# Patient Record
Sex: FEMALE | Race: WHITE | Employment: FULL TIME | URBAN - METROPOLITAN AREA
[De-identification: names, ages, dates, MRNs, and addresses within clinical notes are randomized per-mention and may not be internally consistent; named-entity substitution may affect disease eponyms.]

---

## 2022-08-18 ENCOUNTER — HOSPITAL ENCOUNTER (OUTPATIENT)
Dept: PHYSICAL THERAPY | Age: 57
Setting detail: THERAPIES SERIES
Discharge: HOME | End: 2022-08-18
Payer: COMMERCIAL

## 2022-08-18 PROCEDURE — 97162 PT EVAL MOD COMPLEX 30 MIN: CPT

## 2022-08-18 PROCEDURE — 97110 THERAPEUTIC EXERCISES: CPT
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
## 2022-08-19 ENCOUNTER — HOSPITAL ENCOUNTER (OUTPATIENT)
Dept: PHYSICAL THERAPY | Age: 57
Setting detail: THERAPIES SERIES
Discharge: HOME | End: 2022-08-19
Payer: COMMERCIAL

## 2022-08-19 PROCEDURE — 97110 THERAPEUTIC EXERCISES: CPT

## 2022-08-19 NOTE — FLOWSHEET NOTE
[facility] Outpatient Physical Therapy   [address]   Phone: [phone]   Fax: [phone]    Physical Therapy Daily Treatment Note      Date:  2022  Patient Name:  Anthony Bronson    :  1965  MRN: [id number]    Referring Provider:   Elisha Burroughs MD     Insurance: 3-Hab [facility] C9 presumptive Iris Likens - 3x week for 3 weeks (medical necessity)  Medical Diagnosis:  S06.0X1D (ICD-10-CM) - Concussion with loss of consciousness of 30 minutes or less, subsequent encounter   S06.5X0D (ICD-10-CM) - Traumatic subdural hemorrhage without loss of consciousness, subsequent encounter   S06. 6X9D (ICD-10-CM) - Traumatic subarachnoid hemorrhage with loss of consciousness of unspecified duration, subsequent encounter   S82.101D (ICD-10-CM) - Unspecified fracture of upper end of right tibia, subsequent encounter for closed fracture with routine healing   S00.83XD (ICD-10-CM) - Contusion of other part of head, subsequent encounter      Rehab Codes: R26.2, R 53.1  Onset date: 22                 Next 's appt.: Dr Harsh Chang 22, Ortho 22  Visit count  (currently approved for 9 visits total)                             Cancel/No Shows: 0/0      Subjective:  Patient reporting to therapy stating she was sore after her HEP yesterday.   Pain:  [x] Yes  [] No Location: RLE  Pain Rating: (0-10 scale) 2/10  Pain altered Tx:  [] No  [] Yes  Action:  Comments:    Objective:  Modalities:   Precautions: 50% WB RLE   Exercises:  Exercise Reps/ Time Weight/ Level Comments Completed   Gastroc Stretch 3x 30\"  RLE Only X   Hamstring Stretch 3x 30\"  RLE Only X   SLR 10x2   HEP  reviewed X   SL hip abd 10x2   HEP  reviewed X   SLclamshells 10x2   X   Prone hip extension 10x2   HEP  reviewed X   Prone HSC 10x2   X   SAQ 10x2 3\"   HEP  reviewed X   LAQ     HEP               Weight shifting onto RLE 15x WB ~75# Utilize scale to ensure proper WB status X Other:    Specific Instructions for next treatment: Specific Instructions for next treatment: focus on knee and hip strengthening. Progress standing  R LE and ambulation once weightbearing restriction is lifted. Assessment: [] Progressing toward goals. [] No change. [x] Other: 8/19 Began session with education on 50% WB status as patient voiced that she didn't fully understand her WB status. Reviewed pt's HEP to ensure proper understanding of exercises with patient demonstrating good understanding and carry over of instruction. Added stretches to decrease tightness in gastroc and hamstring. Patient declined CP/Vaso at the end of session. Educated on elevating her RLE throughout the day with added ankle pumps to decrease swelling and promote circulation in RLE. [x] Patient would continue to benefit from skilled physical therapy services in order to: increased RLE strength to improve stability, ambulate properly and return to PLOF. STG: (to be met in 9 treatments)  ? Pain: to 2/10 at most while walking for patient to return to work and make it through entire shift with minimal pain. ? ROM: of Right knee extension to 0 degrees to normalize stance phase in gait. ? Strength: of R LE to 4+/5 to improve functional strength to prepare for gait without AD. ? Function: on LEFI to <25% impairment. Independent with Home Exercise Programs  Demonstrate Knowledge of fall prevention  LTG: (to be met in 18 treatments)  Patient to ambulate without AD with normal gait pattern (once 100% WB R LE) without pain or imbalance for her to return to work without restrictions. Patient to ascend/descend flight of stairs (once 100% WB R LE) with reciprocal pattern and no pain for her to resume work duties with ease. Patient goals: to get full use of R leg and get back to work.      Pt. Education:  [x] Yes  [] No  [] Reviewed Prior HEP/Ed  Method of Education: [] Verbal  [] Demo  [] Written  Comprehension of Education:  [] Verbalizes understanding. [] Demonstrates understanding. [] Needs review. [] Demonstrates/verbalizes HEP/Ed previously given. Exercises  Supine Knee Extension Strengthening - 2 x daily - 7 x weekly - 2 sets - 10 reps - 5\" hold  Active Straight Leg Raise with Quad Set - 2 x daily - 7 x weekly - 2 sets - 10 reps - 5\" hold  Sidelying Hip Abduction - 2 x daily - 7 x weekly - 2 sets - 10 reps  Prone Hip Extension - 2 x daily - 7 x weekly - 2 sets - 10 reps  Seated Long Arc Quad - 2 x daily - 7 x weekly - 2 sets - 10 reps - 5\" hold    Plan: [x] Continue per plan of care.    [] Other:      Treatment Charges: Mins Units TIME   []  Modalities      [x]  Ther Exercise 45 3 5909-0000   []  Manual Therapy      []  Ther Activities      []  Aquatics      []  Neuromuscular      [] Vasocompression      [] Gait Training      [] Dry needling        [] 1 or 2 muscles        [] 3 or more muscles      []  Other      Total Treatment time 45 3      Time In: 1284            Time Out: 0900    Electronically signed by:  Ronny Hough PTA

## 2022-08-23 ENCOUNTER — HOSPITAL ENCOUNTER (OUTPATIENT)
Dept: PHYSICAL THERAPY | Age: 57
Setting detail: THERAPIES SERIES
Discharge: HOME | End: 2022-08-23
Payer: COMMERCIAL

## 2022-08-23 PROCEDURE — 97110 THERAPEUTIC EXERCISES: CPT

## 2022-08-23 NOTE — FLOWSHEET NOTE
[address] [facility] Outpatient Physical Therapy   [address]   Phone: [phone]   Fax: [phone]    Physical Therapy Daily Treatment Note      Date:  2022  Patient Name:  Juve Landeros    :  1965  MRN: [id number]    Referring Provider:   Yael Marina MD     Insurance: 3-Hab [facility] - 3x week for 3 weeks (medical necessity)  Medical Diagnosis:  S06.0X1D (ICD-10-CM) - Concussion with loss of consciousness of 30 minutes or less, subsequent encounter   S06.5X0D (ICD-10-CM) - Traumatic subdural hemorrhage without loss of consciousness, subsequent encounter   S06. 6X9D (ICD-10-CM) - Traumatic subarachnoid hemorrhage with loss of consciousness of unspecified duration, subsequent encounter   S82.101D (ICD-10-CM) - Unspecified fracture of upper end of right tibia, subsequent encounter for closed fracture with routine healing   S00.83XD (ICD-10-CM) - Contusion of other part of head, subsequent encounter      Rehab Codes: R26.2, R 53.1  Onset date: 22                 Next 's appt.: Dr Degroot Fails 22, Ortho 22  Visit count 3/18 (currently approved for 9 visits total)                             Cancel/No Shows: 0/0      Subjective:  Patient arriving to therapy with FWW. Patient reporting to therapy without any new concerns.   Pain:  [x] Yes  [] No Location: RLE  Pain Rating: (0-10 scale) 1/10- sore  Pain altered Tx:  [] No  [] Yes  Action:  Comments:    Objective:  Modalities:   Precautions: 50% WB RLE   Exercises:  Exercise Reps/ Time Weight/ Level Comments Completed   Gastroc Stretch 3x 30\"  RLE Only X   Hamstring Stretch 3x 30\"  RLE Only X   SLR 10x2   HEP  reviewed X   SL hip abd 10x2   HEP  reviewed X   SLclamshells 10x2   X   Prone hip extension 10x2   HEP  caused inc in pain  X   Prone HSC 10x2   X   SAQ 10x2 3\"   HEP  reviewed X   LAQ     HEP     Hip ABD  10x2 3\"     X                        Weight shifting onto RLE 15x WB ~75# Utilize scale to ensure proper WB status    Gait 156' to exit 50% WB Demonstrating proper WB status and proper use of FWW X                                      Other:    Specific Instructions for next treatment: Specific Instructions for next treatment: focus on knee and hip strengthening. Progress standing  R LE and ambulation once weightbearing restriction is lifted. Assessment: [x] Progressing toward goals. Continued with core, hip and quad strengthening this session as patient is still limited with standing exercises due to 50% WB status. Patient demos good understanding of HEP with occasional cues to correct technique. Added ankle weights to SAQ and add hip ABD to inc hip stability. During amb, educated on performing more fluid gait pattern and maintaining proper distance from walker during amb while maintaining 50% WB.    [] No change. [] Other:       [x] Patient would continue to benefit from skilled physical therapy services in order to: increased RLE strength to improve stability, ambulate properly and return to PLOF. STG: (to be met in 9 treatments)  ? Pain: to 2/10 at most while walking for patient to return to work and make it through entire shift with minimal pain. ? ROM: of Right knee extension to 0 degrees to normalize stance phase in gait. ? Strength: of R LE to 4+/5 to improve functional strength to prepare for gait without AD. ? Function: on LEFI to <25% impairment. Independent with Home Exercise Programs  Demonstrate Knowledge of fall prevention  LTG: (to be met in 18 treatments)  Patient to ambulate without AD with normal gait pattern (once 100% WB R LE) without pain or imbalance for her to return to work without restrictions. Patient to ascend/descend flight of stairs (once 100% WB R LE) with reciprocal pattern and no pain for her to resume work duties with ease. Patient goals: to get full use of R leg and get back to work.      Pt. Education:  [x] Yes  [] No [] Reviewed Prior HEP/Ed  Method of Education: [] Verbal  [] Demo  [] Written  Comprehension of Education:  [] Verbalizes understanding. [] Demonstrates understanding. [] Needs review. [] Demonstrates/verbalizes HEP/Ed previously given. Exercises  Supine Knee Extension Strengthening - 2 x daily - 7 x weekly - 2 sets - 10 reps - 5\" hold  Active Straight Leg Raise with Quad Set - 2 x daily - 7 x weekly - 2 sets - 10 reps - 5\" hold  Sidelying Hip Abduction - 2 x daily - 7 x weekly - 2 sets - 10 reps  Prone Hip Extension - 2 x daily - 7 x weekly - 2 sets - 10 reps  Seated Long Arc Quad - 2 x daily - 7 x weekly - 2 sets - 10 reps - 5\" hold    Plan: [x] Continue per plan of care.    [] Other:      Treatment Charges: Mins Units TIME   []  Modalities      [x]  Ther Exercise 45 3 [id number]   []  Manual Therapy      []  Ther Activities      []  Aquatics      []  Neuromuscular      [] Vasocompression      [] Gait Training      [] Dry needling        [] 1 or 2 muscles        [] 3 or more muscles      []  Other      Total Treatment time 45 3      Time In: 0730            Time Out: 0714    Electronically signed by:  Ruiz Reed PTA

## 2022-08-25 ENCOUNTER — HOSPITAL ENCOUNTER (OUTPATIENT)
Dept: PHYSICAL THERAPY | Age: 57
Setting detail: THERAPIES SERIES
Discharge: HOME | End: 2022-08-25
Payer: COMMERCIAL

## 2022-08-25 PROCEDURE — 97110 THERAPEUTIC EXERCISES: CPT

## 2022-08-25 NOTE — FLOWSHEET NOTE
[facility] Outpatient Physical Therapy   [address]   Phone: [phone]   Fax: [phone]    Physical Therapy Daily Treatment Note      Date:  2022  Patient Name:  Nury Horne    :  1965  MRN: [id number]  Referring Provider:  Agustín Guy MD/ Zenia Ramos MD/       Insurance: 3-Hab [facility] C9 2x/week x 9 weeks 22-22  Medical Diagnosis:  S06.0X1D (ICD-10-CM) - Concussion with loss of consciousness of 30 minutes or less, subsequent encounter   S06.5X0D (ICD-10-CM) - Traumatic subdural hemorrhage without loss of consciousness, subsequent encounter   S06. 6X9D (ICD-10-CM) - Traumatic subarachnoid hemorrhage with loss of consciousness of unspecified duration, subsequent encounter   S82.101D (ICD-10-CM) - Unspecified fracture of upper end of right tibia, subsequent encounter for closed fracture with routine healing   S00.83XD (ICD-10-CM) - Contusion of other part of head, subsequent encounter      Rehab Codes: R26.2, R 53.1  Onset date: 22                 Next 's appt.: Dr Sangita Unger 22, Ortho 22  Visit count                             Cancel/No Shows: 0/0      Subjective:  Patient arriving to therapy with rolling walker. Says her right leg is sore on the outside of her knee. She has been putting more weight through R LE and is able to tolerate it.    Pain:  [x] Yes  [] No Location: RLE  Pain Rating: (0-10 scale) 2/10- sore  Pain altered Tx:  [] No  [] Yes  Action:  Comments:    Objective:  Modalities:   Precautions: 50% WB RLE  progress as tolerate per physicians order  Exercises:  Exercise Reps/ Time Weight/ Level Comments Completed   Gastroc Stretch 3x 30\"  RLE Only    Hamstring Stretch 3x 30\"  RLE Only    SLR 10x2  2# HEP  x   SL hip abd 10x2   HEP  reviewed    SLclamshells 10x2      Prone hip extension 10x2   HEP  caused inc in pain     Prone HSC 10x2 2#  x   SAQ 10x2 3\"  2# HEP  reviewed x   LAQ  2x10  2#  x   Standing Hip ABD R  2x15     x   Hip extension  R  2x15   x          STS with/without UE support 10/10 WB 75% on R 112#  R LE slightly forward staggered stance to complete 75% weightbearing  x   Weight shifting onto RLE x20 WB ~75# 112#  Utilize scale to ensure proper WB status    Gait 200'  50% WB Demonstrating proper WB status and proper use of FWW X   Alternating toe taps 4\" step with UE support through RW X10 each   x                               Other:    Specific Instructions for next treatment: Specific Instructions for next treatment: focus on knee and hip strengthening. Progress standing  R LE and ambulation with SC to No AD as appropriate. Assessment: [x] Progressing toward goals. Progressed with 2# AW with NWB mat exercises to strengthen R hip and knee. Continued weight shifts to 75% WB on Right and added STS with 75% WB on R. Initiated standing R LE strengthening as well. At this point patient is ambulating with minimal to no support through RW and is pushing it as she calls it her \"safety net\". She is appropriate to trial ambulation with straight cane next week. [] No change. [] Other:       [x] Patient would continue to benefit from skilled physical therapy services in order to: increased RLE strength to improve stability, ambulate properly and return to PLOF. STG: (to be met in 9 treatments)  ? Pain: to 2/10 at most while walking for patient to return to work and make it through entire shift with minimal pain. ? ROM: of Right knee extension to 0 degrees to normalize stance phase in gait. ? Strength: of R LE to 4+/5 to improve functional strength to prepare for gait without AD. ? Function: on LEFI to <25% impairment.    Independent with Home Exercise Programs  Demonstrate Knowledge of fall prevention  LTG: (to be met in 18 treatments)  Patient to ambulate without AD with normal gait pattern (once 100% WB R LE) without pain or imbalance for her to return to work without restrictions. Patient to ascend/descend flight of stairs (once 100% WB R LE) with reciprocal pattern and no pain for her to resume work duties with ease. Patient goals: to get full use of R leg and get back to work. Pt. Education:  [x] Yes  [] No  [] Reviewed Prior HEP/Ed  Method of Education: [x] Verbal  [x] Demo  [] Written  Comprehension of Education:  [] Verbalizes understanding. [] Demonstrates understanding. [] Needs review. [x] Demonstrates/verbalizes HEP/Ed previously given. Plan: [x] Continue per plan of care.    [] Other:      Treatment Charges: Mins Units TIME   []  Modalities      [x]  Ther Exercise 45 3 [id number]   []  Manual Therapy      []  Ther Activities      []  Aquatics      []  Neuromuscular      [] Vasocompression      [] Gait Training      [] Dry needling        [] 1 or 2 muscles        [] 3 or more muscles      []  Other      Total Treatment time 45 3      Time In: 4391           Time Out: 6284    Electronically signed by:  Comfort Apple, PT

## 2022-08-26 ENCOUNTER — APPOINTMENT (OUTPATIENT)
Dept: PHYSICAL THERAPY | Age: 57
End: 2022-08-26
Payer: COMMERCIAL

## 2022-08-29 ENCOUNTER — HOSPITAL ENCOUNTER (OUTPATIENT)
Dept: PHYSICAL THERAPY | Age: 57
Setting detail: THERAPIES SERIES
Discharge: HOME | End: 2022-08-29
Payer: COMMERCIAL

## 2022-08-29 PROCEDURE — 97110 THERAPEUTIC EXERCISES: CPT

## 2022-08-29 NOTE — FLOWSHEET NOTE
[address] [facility]   1950 [address] [facility] [address]   Phone: [phone]   Fax: [phone]    Physical Therapy Daily Treatment Note      Date:  2022  Patient Name:  Ana Laura Reese    :  1965  MRN: [id number]  Referring Provider:  Iris Giron MD/ Merline Braxton MD/       Insurance: [employer] [facility] C9 2x/week x 9 weeks 22-22  Medical Diagnosis:  S06.0X1D (ICD-10-CM) - Concussion with loss of consciousness of 30 minutes or less, subsequent encounter   S06.5X0D (ICD-10-CM) - Traumatic subdural hemorrhage without loss of consciousness, subsequent encounter   S06. 6X9D (ICD-10-CM) - Traumatic subarachnoid hemorrhage with loss of consciousness of unspecified duration, subsequent encounter   S82.101D (ICD-10-CM) - Unspecified fracture of upper end of right tibia, subsequent encounter for closed fracture with routine healing   S00.83XD (ICD-10-CM) - Contusion of other part of head, subsequent encounter      Rehab Codes: R26.2, R 53.1  Onset date: 22                 Next 's appt.: Dr Alberto Alfaro 22, Ortho 22  Visit count                             Cancel/No Shows: 0/0      Subjective:  Patient arriving to therapy stating she is sore as she was at [facility] for a ceremony and did a lot of walking including on an incline.    Pain:  [x] Yes  [] No Location: RLE  Pain Rating: (0-10 scale) 2-3/10- sore  Pain altered Tx:  [] No  [] Yes  Action:  Comments:    Objective:  Modalities:   Precautions: 50% WB RLE  progress as tolerated per physicians order  Exercises:  Exercise Reps/ Time Weight/ Level Comments Completed   Gastroc Stretch 3x 30\"  RLE Only    Hamstring Stretch 3x 30\"  RLE Only    SLR 15x2  2# HEP  x   SL hip abd 10x2  2# HEP , added wt 22 x   SLclamshells 10x2      Prone hip extension 10x2   HEP  caused inc in pain     Prone [address] 15x2 2#  x   SAQ 15x2 3\" hold  2# HEP  reviewed x   LAQ  2x15  2#  x   Standing         Hip ABD R  2x15     x   Hip extension  R  2x15   x   March alternation 2x15   x   STS without UE support x15 2# ball  R LE slightly forward staggered stance to complete 75% weightbearing  x   Weight shifting onto RLE 2x15  In //bars L UE support X    Gait 200'  50% WB Demonstrating proper WB status and proper use of FWW    Alternating toe taps 4\" step with UE support through RW [id number]   x                               Other:    Specific Instructions for next treatment: Specific Instructions for next treatment: focus on knee and hip strengthening. Progress standing  R LE and ambulation with SC to No AD as appropriate. Assessment: [x] Progressing toward goals. Increased reps with mat exercises to 15 this date. Patient completes without difficulty but reports muscles do feel fatigued following the exercise. Added standing marches and weightshifts with R LE stance as patient is able to tolerate. Pain did not increase >3/10 throughout session. Held on progressing to the straight cane until next visit since patient did a lot of walking yesterday and has increased soreness to R LE.     [] No change. [] Other:       [x] Patient would continue to benefit from skilled physical therapy services in order to: increased RLE strength to improve stability, ambulate properly and return to PLOF. STG: (to be met in 9 treatments)  ? Pain: to 2/10 at most while walking for patient to return to work and make it through entire shift with minimal pain. ? ROM: of Right knee extension to 0 degrees to normalize stance phase in gait. ? Strength: of R LE to 4+/5 to improve functional strength to prepare for gait without AD. ? Function: on LEFI to <25% impairment. Independent with Home Exercise Programs  Demonstrate Knowledge of fall prevention  LTG: (to be met in 18 treatments)  Patient to ambulate without AD with normal gait pattern (once 100% WB R LE) without pain or imbalance for her to return to work without restrictions.   Patient to ascend/descend flight of stairs (once 100% WB R LE) with reciprocal pattern and no pain for her to resume work duties with ease. Patient goals: to get full use of R leg and get back to work. Pt. Education:  [x] Yes  [] No  [] Reviewed Prior HEP/Ed  Method of Education: [x] Verbal  [x] Demo  [] Written  Comprehension of Education:  [] Verbalizes understanding. [] Demonstrates understanding. [] Needs review. [x] Demonstrates/verbalizes HEP/Ed previously given. Plan: [x] Continue per plan of care.    [] Other:      Treatment Charges: Mins Units TIME   []  Modalities      [x]  Ther Exercise 45 3 1220-6346   []  Manual Therapy      []  Ther Activities      []  Aquatics      []  Neuromuscular      [] Vasocompression      [] Gait Training      [] Dry needling        [] 1 or 2 muscles        [] 3 or more muscles      []  Other      Total Treatment time 45 3      Time In: 1000          Time Out: 6845    Electronically signed by:  Uzma Alcala PT

## 2022-08-31 ENCOUNTER — APPOINTMENT (OUTPATIENT)
Dept: PHYSICAL THERAPY | Age: 57
End: 2022-08-31
Payer: COMMERCIAL

## 2022-09-02 ENCOUNTER — HOSPITAL ENCOUNTER (OUTPATIENT)
Dept: PHYSICAL THERAPY | Age: 57
Setting detail: THERAPIES SERIES
Discharge: HOME | End: 2022-09-02
Payer: COMMERCIAL

## 2022-09-02 PROCEDURE — 97110 THERAPEUTIC EXERCISES: CPT

## 2022-09-02 NOTE — FLOWSHEET NOTE
Resisted ankle 20x red  x                        Standing         Hip ABD R  2x15     x   Hip extension  R  2x15   x   March alternation 2x15   x   STS without UE support x15 2# ball  R LE slightly forward staggered stance to complete 75% weightbearing     Weight shifting onto RLE 2x15  In //bars L UE support    Gait- with '  WBAT  x   Alternating toe taps 4\" step with UE support through RW [id number]   x                               Other:    Specific Instructions for next treatment: Specific Instructions for next treatment: focus on knee and hip strengthening. Progress standing  R LE and ambulation with SC to No AD as appropriate. Assessment: [x] Progressing toward goals. Discussed patient's WB status this date and come to find patient has been performing her standing exercises more full WB than 75% WB. The only complaints she has is mild pain in her R ankle and some in her knee. Patient completed exercises without increase in pain. Patient completed amb with FWW WBAT without increase in pain and was able to demonstrate proper gait pattern. Before amb, educated on proper posture, heel toe technique and elongating step length. [] No change. [] Other:       [x] Patient would continue to benefit from skilled physical therapy services in order to: increased RLE strength to improve stability, ambulate properly and return to PLOF. STG: (to be met in 9 treatments)  ? Pain: to 2/10 at most while walking for patient to return to work and make it through entire shift with minimal pain. ? ROM: of Right knee extension to 0 degrees to normalize stance phase in gait. ? Strength: of R LE to 4+/5 to improve functional strength to prepare for gait without AD. ? Function: on LEFI to <25% impairment.    Independent with Home Exercise Programs  Demonstrate Knowledge of fall prevention  LTG: (to be met in 18 treatments)  Patient to ambulate without AD with normal gait pattern (once 100% WB R LE) without pain or imbalance for her to return to work without restrictions. Patient to ascend/descend flight of stairs (once 100% WB R LE) with reciprocal pattern and no pain for her to resume work duties with ease. Patient goals: to get full use of R leg and get back to work. Pt. Education:  [x] Yes  [] No  [] Reviewed Prior HEP/Ed  Method of Education: [x] Verbal  [x] Demo  [] Written  Comprehension of Education:  [] Verbalizes understanding. [] Demonstrates understanding. [] Needs review. [x] Demonstrates/verbalizes HEP/Ed previously given. Plan: [x] Continue per plan of care.    [] Other:      Treatment Charges: Mins Units TIME   []  Modalities      [x]  Ther Exercise 45 3 4232-7969   []  Manual Therapy      []  Ther Activities      []  Aquatics      []  Neuromuscular      [] Vasocompression      [x] Gait Training 5 - [id number]   [] Dry needling        [] 1 or 2 muscles        [] 3 or more muscles      []  Other      Total Treatment time 50 3      Time In: 0820         Time Out: 4414    Electronically signed by:  Bebeto Nuñez PTA

## 2022-09-06 ENCOUNTER — HOSPITAL ENCOUNTER (OUTPATIENT)
Dept: PHYSICAL THERAPY | Age: 57
Setting detail: THERAPIES SERIES
Discharge: HOME | End: 2022-09-06
Payer: COMMERCIAL

## 2022-09-06 PROCEDURE — 97116 GAIT TRAINING THERAPY: CPT

## 2022-09-06 PROCEDURE — 97110 THERAPEUTIC EXERCISES: CPT

## 2022-09-06 NOTE — FLOWSHEET NOTE
SAQ 15x2 3\" hold  2# HEP 8/19 reviewed    LAQ  2x20 3\"  2#  x   Resisted ankle 20x red Added to HEP 9/6/22 x                        Standing        Gastroc stetching  3x30\"   x    Hip ABD R  2x15  2# AW   x   Hip extension  R  2x15 2# AW  x   Standing HSC R 2x15 2#AW  X    March alternation 2x15      STS without UE support x15 2# ball  R LE slightly forward staggered stance to complete 75% weightbearing     Weight shifting onto RLE 2x15  In //bars L UE support    Gait- with '  WBAT  x   Gait without AD  150'   x   Weight shift stepping through R LE with SC in //bars  x15    x   Alternating toe taps 4\" step without UE support  2 [id number]   x                               Other:    Specific Instructions for next treatment: Specific Instructions for next treatment: focus on knee and hip strengthening. Progress standing  R LE and ambulation with SC to No AD as appropriate. Assessment: [x] Progressing toward goals. Continued with resisted 4 way ankle to improve strength and stability in RLE as WB is progressed. Added gastroc stretching to decrease ankle soreness. Progressed ambulation with straight cane and quickly advanced to no AD as patient was not relying on straight cane and it was slowing her gait pattern. Patient ambulated without AD with good balance and R LE stability. She reports soreness in R lateral knee but tolerable. Will continue to progress ambulation and WB through R LE as patient is returning to work next week. [] No change. [] Other:       [x] Patient would continue to benefit from skilled physical therapy services in order to: increased RLE strength to improve stability, ambulate properly and return to PLOF. STG: (to be met in 9 treatments)  ? Pain: to 2/10 at most while walking for patient to return to work and make it through entire shift with minimal pain. ? ROM: of Right knee extension to 0 degrees to normalize stance phase in gait. ?  Strength: of R LE to 4+/5 to improve functional strength to prepare for gait without AD. ? Function: on LEFI to <25% impairment. Independent with Home Exercise Programs  Demonstrate Knowledge of fall prevention  LTG: (to be met in 18 treatments)  Patient to ambulate without AD with normal gait pattern (once 100% WB R LE) without pain or imbalance for her to return to work without restrictions. Patient to ascend/descend flight of stairs (once 100% WB R LE) with reciprocal pattern and no pain for her to resume work duties with ease. Patient goals: to get full use of R leg and get back to work. Pt. Education:  [x] Yes  [] No  [] Reviewed Prior HEP/Ed  Method of Education: [x] Verbal  [x] Demo  [] Written  Comprehension of Education:  [] Verbalizes understanding. [] Demonstrates understanding. [] Needs review. [x] Demonstrates/verbalizes HEP/Ed previously given. Plan: [x] Continue per plan of care.    [] Other:      Treatment Charges: Mins Units TIME   []  Modalities      [x]  Ther Exercise 35 2 0900-148   []  Manual Therapy      []  Ther Activities      []  Aquatics      []  Neuromuscular      [] Vasocompression      [x] Gait Training 10 1 6810-5349   [] Dry needling        [] 1 or 2 muscles        [] 3 or more muscles      []  Other      Total Treatment time 45 3 2718-7078     Time In: 0900        Time Out: 8000    Electronically signed by:  Yusuf Cardenas PT

## 2022-09-09 ENCOUNTER — HOSPITAL ENCOUNTER (OUTPATIENT)
Dept: PHYSICAL THERAPY | Age: 57
Setting detail: THERAPIES SERIES
Discharge: HOME | End: 2022-09-09
Payer: COMMERCIAL

## 2022-09-09 PROCEDURE — 97116 GAIT TRAINING THERAPY: CPT

## 2022-09-09 PROCEDURE — 97110 THERAPEUTIC EXERCISES: CPT

## 2022-09-09 NOTE — FLOWSHEET NOTE
Prone hip extension 10x2   HEP 8/22 caused inc in pain     Prone [address] 15x2 2#     SAQ 15x2 3\" hold  2# HEP 8/19 reviewed    LAQ  2x20 3\"  2#     Resisted ankle 20x red Added to HEP 9/6/22                         Standing        Gastroc stetching  3x30\"       Hip ABD R  2x15  2# AW  9/9 B x   Hip extension  R  2x15 2# AW 9/9 B x   Standing HSC R 2x15 2#AW 9/9 B x    March alternation 2x15 2#AW  x   STS without UE support x15 2# ball  R LE slightly forward staggered stance to complete 75% weightbearing     Gait- with ' WBAT  x   Gait without AD  150'      Weight shift stepping through R LE with SC in //bars  x15       Alternating toe taps 6\" step without UE support  2 [id number] 2# AW 9/9 added AW x   Heel/toe raises 2x15  9/9 added this date and had inc in pain in B knees. x                        Other:    Specific Instructions for next treatment: Specific Instructions for next treatment: focus on knee and hip strengthening. Progress standing  R LE and ambulation with SC to No AD as appropriate. Assessment: [x] Progressing toward goals. Primarily focused on standing core and hip strengthening and gait training with SPC and amb tolerance this session. Patient had mild discomfort with amb in R ankle and in R knee with NuStep and discomfort in B knees with heel/toe raises that subsided with rest.  No increase in pain noted at the end of session. [] No change. [] Other:       [x] Patient would continue to benefit from skilled physical therapy services in order to: increased RLE strength to improve stability, ambulate properly and return to PLOF. STG: (to be met in 9 treatments)  ? Pain: to 2/10 at most while walking for patient to return to work and make it through entire shift with minimal pain. ? ROM: of Right knee extension to 0 degrees to normalize stance phase in gait. ? Strength: of R LE to 4+/5 to improve functional strength to prepare for gait without AD. ?  Function: on LEFI to <25% impairment. Independent with Home Exercise Programs  Demonstrate Knowledge of fall prevention  LTG: (to be met in 18 treatments)  Patient to ambulate without AD with normal gait pattern (once 100% WB R LE) without pain or imbalance for her to return to work without restrictions. Patient to ascend/descend flight of stairs (once 100% WB R LE) with reciprocal pattern and no pain for her to resume work duties with ease. Patient goals: to get full use of R leg and get back to work. Pt. Education:  [x] Yes  [] No  [] Reviewed Prior HEP/Ed  Method of Education: [x] Verbal  [x] Demo  [] Written  Comprehension of Education:  [] Verbalizes understanding. [] Demonstrates understanding. [] Needs review. [x] Demonstrates/verbalizes HEP/Ed previously given. Plan: [x] Continue per plan of care.    [] Other:      Treatment Charges: Mins Units TIME   []  Modalities      [x]  Ther Exercise 35 2 5631-8990   []  Manual Therapy      []  Ther Activities      []  Aquatics      []  Neuromuscular      [] Vasocompression      [x] Gait Training 10 3 0175-7232   [] Dry needling        [] 1 or 2 muscles        [] 3 or more muscles      []  Other      Total Treatment time 45 3      Time In: 7121        Time Out: 0900    Electronically signed by:  Agnes Alejandro PTA

## 2022-09-13 ENCOUNTER — HOSPITAL ENCOUNTER (OUTPATIENT)
Dept: PHYSICAL THERAPY | Age: 57
Setting detail: THERAPIES SERIES
Discharge: HOME | End: 2022-09-13
Payer: COMMERCIAL

## 2022-09-13 PROCEDURE — 97116 GAIT TRAINING THERAPY: CPT

## 2022-09-13 PROCEDURE — 97110 THERAPEUTIC EXERCISES: CPT

## 2022-09-13 NOTE — PROGRESS NOTES
[address] [facility]   [facility]   Phone: [phone]   Fax: [phone]    Progress Note/ Physical Therapy Daily Treatment Note      Date:  2022  Patient Name:  Lauren Gomez    :  1965  MRN: [id number]  Referring Provider:  Rachelle Santamaria MD/ Mehul Chambers MD/       Insurance: [employer] [facility] C9 2x/week x 9 weeks 22-22  Medical Diagnosis:  S06.0X1D (ICD-10-CM) - Concussion with loss of consciousness of 30 minutes or less, subsequent encounter   S06.5X0D (ICD-10-CM) - Traumatic subdural hemorrhage without loss of consciousness, subsequent encounter   S06. 6X9D (ICD-10-CM) - Traumatic subarachnoid hemorrhage with loss of consciousness of unspecified duration, subsequent encounter   S82.101D (ICD-10-CM) - Unspecified fracture of upper end of right tibia, subsequent encounter for closed fracture with routine healing   S00.83XD (ICD-10-CM) - Contusion of other part of head, subsequent encounter      Rehab Codes: R26.2, R 53.1  Onset date: 22                 Next 's appt.: Dr Deb Pelletier 22, Ortho 22  Visit count                             Cancel/No Shows: 0/0  Reporting period: 22-22    Subjective:  Patient reports today with increased pain and fatigue as she went back to work yesterday. Reports low pain right now but when at work (up and moving) it goes up to a 4/10. Says she is walking at work without AD except to get to/from work space as it is a long walk. Pain:  [x] Yes  [] No Location: R TA region  Pain Rating: (0-10 scale) 2/10- sore  Pain altered Tx:  [] No  [] Yes  Action:  Comments:    Objective:  Modalities:   Precautions: WBAT; per Dr. Coker Deshler restrictions 10-15lbs and no kneeling, stooping, squatting, climbing.   Exercises:  Exercise Reps/ Time Weight/ Level Comments Completed   NuStep 5' 3 With BLE    Gastroc Stretch 3x 30\"  RLE Only    Hamstring Stretch 3x 30\"  RLE Only    SLR 15x2  2# HEP     SL hip abd 10x2  2# HEP , added wt 8/29/22    SLclamshells 10x2      Prone hip extension 10x2   HEP 8/22 caused inc in pain     Prone [address] 15x2 2#     SAQ 15x2 3\" hold  2# HEP 8/19 reviewed    LAQ  2x20 3\"  2#     Resisted ankle 20x red Added to HEP 9/6/22                         Standing        Cone taps X15    x   Gastroc stretching  3x30\"       Hip ABD R  2x15  2# AW  x   Hip extension  R  2x15 2# AW  x   Standing Ag[address] R 2x15 2#AW     March alternation 2x15 2#AW  x   STS without UE support x15 2# ball  R LE slightly forward staggered stance to complete 75% weightbearing     Gait- with ' WBAT  x   Gait without AD  150'   x   Weight shift stepping through R LE with SC in //bars  x15       Alternating toe taps 6\" step without UE support  2 [id number] 2# AW 9/9 added AW    Heel/toe raises 2x15  9/9 added this date and had inc in pain in B knees. Other:    Objective measures:  Right knee extension 0 degrees (TKE). MMT R LE- hip flexion 4/5, hip abduction 4+/5, hip extension 4/5, knee flexion 4/5, knee extension 4+/5 (slight pain). LEFI (3 items omitted due to NA)- 31/68= 45.5% function (26% function on evaluation). Specific Instructions for next treatment: Specific Instructions for next treatment: focus on knee and hip strengthening. Progress standing  R LE and ambulation with SC to No AD as appropriate. Assessment: [x] Progressing toward goals. Patient has met 3/6 STGs at this time with knee extension AROM to 0 degrees. Progress made with RLE strength as well. Patient with increased pain this date due to returning to work so after goals assessed completed some standing exercises to tolerance. Added alternating cone taps without UE support. At 15 reps patient is very fatigued in R LE and reports soreness. Ambulation with SC and without AD completed this date with normal gait pattern. LEFI address today with score of 45.5% function (19.5% improvement since evaluation).  Patient will benefit from continuing therapy to progress WB through R LE with increased standing/walking tolerance and decrease pain. [] No change. [] Other:       [x] Patient would continue to benefit from skilled physical therapy services in order to: increased RLE strength to improve stability, ambulate properly and return to PLOF. STG: (to be met in 9 treatments)  ? Pain: to 2/10 at most while walking for patient to return to work and make it through entire shift with minimal pain. ? ROM: of Right knee extension to 0 degrees to normalize stance phase in gait. MET   ? Strength: of R LE to 4+/5 to improve functional strength to prepare for gait without AD. ? Function: on LEFI to <25% impairment. Independent with Home Exercise Programs- MET   Demonstrate Knowledge of fall prevention- MET  LTG: (to be met in 18 treatments)  Patient to ambulate without AD with normal gait pattern (once 100% WB R LE) without pain or imbalance for her to return to work without restrictions. Patient to ascend/descend flight of stairs (once 100% WB R LE) with reciprocal pattern and no pain for her to resume work duties with ease. Patient goals: to get full use of R leg and get back to work. Pt. Education:  [x] Yes  [] No  [] Reviewed Prior HEP/Ed  Method of Education: [x] Verbal  [x] Demo  [] Written  Comprehension of Education:  [] Verbalizes understanding. [] Demonstrates understanding. [] Needs review. [x] Demonstrates/verbalizes HEP/Ed previously given. Plan: [x] Continue per plan of care.    [] Other:      Treatment Charges: Mins Units TIME   []  Modalities      [x]  Ther Exercise 35 2 8989-6703   []  Manual Therapy      []  Ther Activities      []  Aquatics      []  Neuromuscular      [] Vasocompression      [x] Gait Training 10 1 [id number]   [] Dry needling        [] 1 or 2 muscles        [] 3 or more muscles      []  Other      Total Treatment time 40 3      Time In: 1600    Time Out: 1640    Electronically signed by:  Yvonne Bullock, PT

## 2022-09-15 ENCOUNTER — HOSPITAL ENCOUNTER (OUTPATIENT)
Dept: PHYSICAL THERAPY | Age: 57
Setting detail: THERAPIES SERIES
Discharge: HOME | End: 2022-09-15
Payer: COMMERCIAL

## 2022-09-15 PROCEDURE — 97110 THERAPEUTIC EXERCISES: CPT

## 2022-09-15 NOTE — PROGRESS NOTES
[facility]   [address]   Phone: [phone]   Fax: [phone]  Physical Therapy Daily Treatment Note      Date:  9/15/2022  Patient Name:  Tami Schafer    :  1965  MRN: [id number]  Referring Provider:  Makenzie Contreras MD/ Sonya Ku MD/       Insurance: [employer]ab [facility] C9 2x/week x 9 weeks 22-22  Medical Diagnosis:  S06.0X1D (ICD-10-CM) - Concussion with loss of consciousness of 30 minutes or less, subsequent encounter   S06.5X0D (ICD-10-CM) - Traumatic subdural hemorrhage without loss of consciousness, subsequent encounter   S06. 6X9D (ICD-10-CM) - Traumatic subarachnoid hemorrhage with loss of consciousness of unspecified duration, subsequent encounter   S82.101D (ICD-10-CM) - Unspecified fracture of upper end of right tibia, subsequent encounter for closed fracture with routine healing   S00.83XD (ICD-10-CM) - Contusion of other part of head, subsequent encounter      Rehab Codes: R26.2, R 53.1  Onset date: 22                 Next 's appt.: Dr Stacia Lal 22, Ortho 22  Visit count 10/18                            Cancel/No Shows: 0/0      Subjective:  Patient reports today stating she is \"tired but she made it. \" Says she is extra tired since this is her first week back to work. She says some pain with increased time on her feet that gets up to a 3-4/10. Reports she over did it being on her feet for 3 hours before taking a seated break. Pain:  [x] Yes  [] No Location: R TA region  Pain Rating: (0-10 scale) 3/10- sore  Pain altered Tx:  [] No  [] Yes  Action:  Comments:    Objective:  Modalities:   Precautions: WBAT; per Dr. Jude Cash restrictions 10-15lbs and no kneeling, stooping, squatting, climbing.   Exercises:  Exercise Reps/ Time Weight/ Level Comments Completed   NuStep 5' 4 With BLE x   Gastroc Stretch 3x 30\"      Hamstring Stretch 3x 30\"  RLE Only    SLR 15x2  2# HEP     SL hip abd 10x2  2# HEP , added wt 8/29/22    SLclamshells 15x2 RED  x   Prone hip extension 10x2   HEP 8/22 caused inc in pain     Prone Agrippinastraat 180 15x2 2#     SAQ 15x2 3\" hold  2# HEP 8/19 reviewed    LAQ  2x20 3\"  2#     Resisted ankle 20x red Added to HEP 9/6/22    Supine SLR with hip abduction sequence 2x15   x   SL hip circles  2x10 each direction   X           Standing        Cone taps X15       Gastroc stretching  3x30\"   x    Hip ABD R  2x20  2# AW  x   Hip extension  R  2x15 2# AW     Standing Agrippinastraat 180 R 2x15 2#AW  x   March alternation 2x15 2#AW R only on 9/15/22 x   STS without UE support x15 2# ball  R LE slightly forward staggered stance to complete 75% weightbearing     Gait- with ' WBAT     Gait without AD  150'      Weight shift stepping through R LE with SC in //bars  x15       Alternating toe taps 6\" step without UE support  2 B60sepr 2# AW 9/9 added AW    Heel/toe raises 2x15  9/9 added this date and had inc in pain in B knees. Other:          Specific Instructions for next treatment: Specific Instructions for next treatment: focus on knee and hip strengthening. Progress standing  R LE and ambulation with SC to No AD as appropriate. Assessment: [x] Progressing toward goals. Focused on less WB on R LE today due to long day at work with increased standing time. Continued Nustep this date with increased resistance. Added supine hip flexion with hip abduction sequence to improve strength and knee stability. Continued gastroc stretching as well to prevent spasms in lower leg with increased standing at work. Patient encouraged to take seated rest breaks more often during her work day to avoid increased pain in right LE.     [] No change. [] Other:       [x] Patient would continue to benefit from skilled physical therapy services in order to: increased RLE strength to improve stability, ambulate properly and return to PLOF. STG: (to be met in 9 treatments)  ?  Pain: to 2/10 at most while walking for patient to return to work and make it through entire shift with minimal pain. ? ROM: of Right knee extension to 0 degrees to normalize stance phase in gait. MET   ? Strength: of R LE to 4+/5 to improve functional strength to prepare for gait without AD. ? Function: on LEFI to <25% impairment. Independent with Home Exercise Programs- MET   Demonstrate Knowledge of fall prevention- MET  LTG: (to be met in 18 treatments)  Patient to ambulate without AD with normal gait pattern (once 100% WB R LE) without pain or imbalance for her to return to work without restrictions. Patient to ascend/descend flight of stairs (once 100% WB R LE) with reciprocal pattern and no pain for her to resume work duties with ease. Patient goals: to get full use of R leg and get back to work. Pt. Education:  [x] Yes  [] No  [] Reviewed Prior HEP/Ed  Method of Education: [x] Verbal  [x] Demo  [] Written  Comprehension of Education:  [] Verbalizes understanding. [] Demonstrates understanding. [] Needs review. [x] Demonstrates/verbalizes HEP/Ed previously given. Plan: [x] Continue per plan of care.    [] Other:      Treatment Charges: Mins Units TIME   []  Modalities      [x]  Ther Exercise 40 3 6602-3050   []  Manual Therapy      []  Ther Activities      []  Aquatics      []  Neuromuscular      [] Vasocompression      [x] Gait Training      [] Dry needling        [] 1 or 2 muscles        [] 3 or more muscles      []  Other      Total Treatment time 40 3 4228-7386     Time In: 8818    Time HJO:6244    Electronically signed by:  Ted Charles PT

## 2022-09-20 ENCOUNTER — HOSPITAL ENCOUNTER (OUTPATIENT)
Dept: PHYSICAL THERAPY | Age: 57
Setting detail: THERAPIES SERIES
Discharge: HOME | End: 2022-09-20
Payer: COMMERCIAL

## 2022-09-20 PROCEDURE — 97110 THERAPEUTIC EXERCISES: CPT

## 2022-09-20 NOTE — FLOWSHEET NOTE
[facility] Outpatient Physical Therapy   [address]   Phone: [phone]   Fax: [phone]  Physical Therapy Daily Treatment Note      Date:  2022  Patient Name:  Jayden Mcclain    :  1965  MRN: [id number]  Referring Provider:  Aki Reyes MD/ Mando Marques MD/       Insurance: [employer] [facility] C9 2x/week x 9 weeks 22-22  Medical Diagnosis:  S06.0X1D (ICD-10-CM) - Concussion with loss of consciousness of 30 minutes or less, subsequent encounter   S06.5X0D (ICD-10-CM) - Traumatic subdural hemorrhage without loss of consciousness, subsequent encounter   S06. 6X9D (ICD-10-CM) - Traumatic subarachnoid hemorrhage with loss of consciousness of unspecified duration, subsequent encounter   S82.101D (ICD-10-CM) - Unspecified fracture of upper end of right tibia, subsequent encounter for closed fracture with routine healing   S00.83XD (ICD-10-CM) - Contusion of other part of head, subsequent encounter      Rehab Codes: R26.2, R 53.1  Onset date: 22                 Next 's appt.: Dr Lali Araujo 22, Ortho 22  Visit count                             Cancel/No Shows: 0/0      Subjective: Patient reporting to therapy with FWW. Patient reporting that work is rough but is going. Patient stating she continues to have pain in the lateral aspect of knee. Patient to see ortho tomorrow. Pain:  [x] Yes  [] No Location: R TA region  Pain Rating: (0-10 scale) 4/10- sore  Pain altered Tx:  [] No  [] Yes  Action:  Comments:    Objective:  :  Assessed R knee with increased swelling noted at lateral aspect of knee and warmth coming from knee. Modalities:   Precautions: WBAT; per Dr. Abdi Simmons restrictions 10-15lbs and no kneeling, stooping, squatting, climbing.   Exercises:  Exercise Reps/ Time Weight/ Level Comments Completed   NuStep 5' 4 With BLE x   Gastroc Stretch 3x 30\"      Hamstring Stretch 3x 30\"  RLE Only    SLR 15x2  2# HEP     SL hip abd 10x2  2# HEP , added wt 8/29/22    SLclamshells 15x2 RED     Prone hip extension 10x2   HEP 8/22 caused inc in pain     Prone Agrippinastraat 180 15x2 2#     SAQ 15x2 3\" hold  2# HEP 8/19 reviewed    LAQ  2x20 3\"  2# RLE only x   Resisted ankle 20x red Added to HEP 9/6/22    Supine SLR with hip abduction sequence 2x15      SL hip circles  2x10 each direction             Standing        Cone taps X15       Gastroc stretching  3x30\"      3 way   15x2  2# AW  x   Standing Agrippin[address]raat 180 R 2x15 2#AW  x   March alternation 2x15 2#AW + foam 9/20 15x without foam 2x15 with foam finger tip support x   STS without UE support x15 2# ball  R LE slightly forward staggered stance to complete 75% weightbearing     Gait- with ' WBAT     Gait without AD  2x  9/20 short distances within gym but required use of walker post standing exercises due to pain and fatigue. Alternating toe taps 6\" step without UE support  2 [id number] 2# AW 9/9 added AW    Heel/toe raises 2x15  9/20 no increase in pain noted this session. x   TG squats 10x2  9/20 Added for gravity reduced exercise-- no increased pain noted during or post exercise. x                 Other:    Specific Instructions for next treatment: Specific Instructions for next treatment: focus on knee and hip strengthening. Progress standing  R LE and ambulation with SC to No AD as appropriate. Assessment: [x] Progressing toward goals. 9/20 Primarily focused session on standing/CKC exercises to improve patient's standing tolerance and strength. Patient was able to amb short distances within the clinic without AD but as standing exercises progressed patient required AD due to increase pain in lateral aspect of knee. Added TG mini squats for gravity reduced exercise to promote knee strength and ROM. No increase in pain noted at the end of session. [] No change.      [] Other:       [x] Patient would continue to benefit from skilled physical therapy services in order to: increased RLE strength to improve stability, ambulate properly and return to PLOF. STG: (to be met in 9 treatments)  ? Pain: to 2/10 at most while walking for patient to return to work and make it through entire shift with minimal pain. ? ROM: of Right knee extension to 0 degrees to normalize stance phase in gait. MET   ? Strength: of R LE to 4+/5 to improve functional strength to prepare for gait without AD. ? Function: on LEFI to <25% impairment. Independent with Home Exercise Programs- MET   Demonstrate Knowledge of fall prevention- MET  LTG: (to be met in 18 treatments)  Patient to ambulate without AD with normal gait pattern (once 100% WB R LE) without pain or imbalance for her to return to work without restrictions. Patient to ascend/descend flight of stairs (once 100% WB R LE) with reciprocal pattern and no pain for her to resume work duties with ease. Patient goals: to get full use of R leg and get back to work. Pt. Education:  [x] Yes  [] No  [] Reviewed Prior HEP/Ed  Method of Education: [x] Verbal  [x] Demo  [] Written  Comprehension of Education:  [] Verbalizes understanding. [] Demonstrates understanding. [] Needs review. [x] Demonstrates/verbalizes HEP/Ed previously given. Plan: [x] Continue per plan of care.    [] Other:      Treatment Charges: Mins Units TIME   []  Modalities      [x]  Ther Exercise 45 3 1067-4446   []  Manual Therapy      []  Ther Activities      []  Aquatics      []  Neuromuscular      [] Vasocompression      [] Gait Training      [] Dry needling        [] 1 or 2 muscles        [] 3 or more muscles      []  Other      Total Treatment time 45 3 7782-7807     Time In: 3356    Time Out:1600    Electronically signed by:  Lynne Jerez PTA

## 2022-09-22 ENCOUNTER — APPOINTMENT (OUTPATIENT)
Dept: PHYSICAL THERAPY | Age: 57
End: 2022-09-22
Payer: COMMERCIAL

## 2022-09-28 ENCOUNTER — HOSPITAL ENCOUNTER (OUTPATIENT)
Dept: PHYSICAL THERAPY | Age: 57
Setting detail: THERAPIES SERIES
Discharge: HOME | End: 2022-09-28
Payer: COMMERCIAL

## 2022-09-28 PROCEDURE — 97110 THERAPEUTIC EXERCISES: CPT

## 2022-09-28 NOTE — FLOWSHEET NOTE
[facility] Outpatient Physical Therapy   [address]   Phone: [phone]   Fax: [phone]  Physical Therapy Daily Treatment Note      Date:  2022  Patient Name:  Ganesh Starks    :  1965  MRN: [id number]  Referring Provider:  Tacho Callahan MD/ Salvatore Gonzalez MD/       Insurance: [employer] [facility] C9 2x/week x 9 weeks 22-22  Medical Diagnosis:  S06.0X1D (ICD-10-CM) - Concussion with loss of consciousness of 30 minutes or less, subsequent encounter   S06.5X0D (ICD-10-CM) - Traumatic subdural hemorrhage without loss of consciousness, subsequent encounter   S06. 6X9D (ICD-10-CM) - Traumatic subarachnoid hemorrhage with loss of consciousness of unspecified duration, subsequent encounter   S82.101D (ICD-10-CM) - Unspecified fracture of upper end of right tibia, subsequent encounter for closed fracture with routine healing   S00.83XD (ICD-10-CM) - Contusion of other part of head, subsequent encounter      Rehab Codes: R26.2, R 53.1  Onset date: 22                 Next 's appt.: Dr Mynor Gotti 22, Ortho 22  Visit count                             Cancel/No Shows: 0/0      Subjective: Patient reporting today with frustration about work as she says she has been working too hard and often lacks the assistants she needs. Admits she is also overworking herself. On Monday she had to take Tylenol after work and says upon leaving work she was in tears. Says she is sitting every 1-1.5 hours during her work day (now). Says today went better. Reports she is doing her HEP at least every morning. Arrives with quad cane today. Pain:  [x] Yes  [] No Location: R TA region  Pain Rating: (0-10 scale) 3-4/10- sore  Pain altered Tx:  [] No  [] Yes  Action:  Comments:    Objective:    Modalities:   Precautions: WBAT; per Dr. Macrina Alvarado restrictions 10-15lbs and no kneeling, stooping, squatting, climbing.   Exercises:  Exercise Reps/ Time Weight/ Level Comments Completed   NuStep 5' 4 With BLE    Gastroc Stretch 3x 30\"      Hamstring Stretch 3x 30\"  RLE Only    SLR 15x2  2# HEP     SL hip abd 10x2  2# HEP , added wt 8/29/22    SLclamshells 15x2 RED     Prone hip extension 10x2   HEP 8/22 caused inc in pain     Prone Agrippin[address]raat 180 15x2 2#     SAQ 15x2 3\" hold  2# HEP 8/19 reviewed    LAQ  2x20 3\"  2# RLE only    Resisted ankle 20x red Added to HEP 9/6/22    Supine SLR with hip abduction sequence 2x15   x   SL hip circles  2x10 each direction             Standing        Cone taps X15       Gastroc stretching  3x30\"      2 way hip (L)  20x2   On foam R stance leg  x   Standing Agrippin[address]raat 180 R 2x15 2#AW     March alternation 2x20   x   STS without UE support x15 2# ball  R LE slightly forward staggered stance to complete 75% weightbearing     Gait- with ' WBAT     Gait without AD  2x  9/20 short distances within gym but required use of walker post standing exercises due to pain and fatigue. Alternating toe taps 6\" step without UE support  2 [id number] 2# AW 9/9 added AW    Heel/toe raises 2x15  9/20 no increase in pain noted this session. x   4\" step up (L) 2x20  Standing on R  X                  Other:    Specific Instructions for next treatment: Specific Instructions for next treatment: focus on knee and hip strengthening. Progress standing  R LE and ambulation with SC to No AD as appropriate. Assessment: [x] Progressing toward goals. Completed today's sessions without weights due to patient tired and fatigued arriving after work. As well as increased pain lately due to being back to work. Discussed with patient the benefits of aquatic therapy and she is willing to try it to allow weightbearing with less force through the R LE and less pain. This to be initiated next week. PN next session to address goals and add aquatic to POC. Following weightbearing exercises this date patient reports burning in R LE.     [] No change.      [] Other:       [x] Patient would continue to benefit from skilled physical therapy services in order to: increased RLE strength to improve stability, ambulate properly and return to PLOF. STG: (to be met in 9 treatments)  ? Pain: to 2/10 at most while walking for patient to return to work and make it through entire shift with minimal pain. ? ROM: of Right knee extension to 0 degrees to normalize stance phase in gait. MET   ? Strength: of R LE to 4+/5 to improve functional strength to prepare for gait without AD. ? Function: on LEFI to <25% impairment. Independent with Home Exercise Programs- MET   Demonstrate Knowledge of fall prevention- MET  LTG: (to be met in 18 treatments)  Patient to ambulate without AD with normal gait pattern (once 100% WB R LE) without pain or imbalance for her to return to work without restrictions. Patient to ascend/descend flight of stairs (once 100% WB R LE) with reciprocal pattern and no pain for her to resume work duties with ease. Patient goals: to get full use of R leg and get back to work. Pt. Education:  [x] Yes  [] No  [] Reviewed Prior HEP/Ed  Method of Education: [x] Verbal  [x] Demo  [] Written  Comprehension of Education:  [] Verbalizes understanding. [] Demonstrates understanding. [] Needs review. [x] Demonstrates/verbalizes HEP/Ed previously given. Plan: [x] Continue per plan of care.    [] Other:      Treatment Charges: Mins Units TIME   []  Modalities      [x]  Ther Exercise 44 3 4610-9729   []  Manual Therapy      []  Ther Activities      []  Aquatics      []  Neuromuscular      [] Vasocompression      [] Gait Training      [] Dry needling        [] 1 or 2 muscles        [] 3 or more muscles      []  Other      Total Treatment time 44 3 2865-8288     Time In: 0730   Time RSE:0881    Electronically signed by:  Bora Denis, PT

## 2022-09-30 ENCOUNTER — HOSPITAL ENCOUNTER (OUTPATIENT)
Dept: PHYSICAL THERAPY | Age: 57
Setting detail: THERAPIES SERIES
Discharge: HOME | End: 2022-09-30
Payer: COMMERCIAL

## 2022-09-30 PROCEDURE — 97110 THERAPEUTIC EXERCISES: CPT

## 2022-09-30 NOTE — PROGRESS NOTES
[facility] Outpatient Physical Therapy  [facility]         Phone: [phone]       Fax: [phone]    Physical Therapy Progress Note    Date: 2022      Patient: Av Schmitt  : 1965  MRN: [id number]    Referring Provider:  Blanca Rico MD/ Elkin Brown MD/       Insurance: 3-[employer]ab [facility] C9 2x/week x 9 weeks 22-10/14/22, NEW C9( 2x/ week for 9 weeks starts 10/15/22- )   Medical Diagnosis:  S06.0X1D (ICD-10-CM) - Concussion with loss of consciousness of 30 minutes or less, subsequent encounter   S06.5X0D (ICD-10-CM) - Traumatic subdural hemorrhage without loss of consciousness, subsequent encounter   S06. 6X9D (ICD-10-CM) - Traumatic subarachnoid hemorrhage with loss of consciousness of unspecified duration, subsequent encounter   S82.101D (ICD-10-CM) - Unspecified fracture of upper end of right tibia, subsequent encounter for closed fracture with routine healing   S00.83XD (ICD-10-CM) - Contusion of other part of head, subsequent encounter      Rehab Codes: R26.2, R 53.1  Onset date: 22                 Next 's appt.: Dr Anita Rosas 10/6/22, Ortho 22  Visit count                             Cancel/No Shows: 0/0  Date of initial visit: 22              Reporting period 9/15/22-22    Subjective: Patient arrives today with mild pain. She presents with concerns about her R knee hyperextending when standing and walking. She also reports her exercises seem harder to do then before. Pain:  [x] Yes  [] No  Location: R knee  Pain Rating: (0-10 scale) 2-3/10  Pain altered Tx:  [] No  [] Yes  Action:  Comments:    Objective:  Test Measurements: R LE strength MMT all 4+/5 except R knee flexion 4/5. Right knee extension AROM 0 degrees TKE, left knee extension AROM -3 degrees. Function:LEFI- (3 items omitted) 35/68= 51.4% function (45.5% function on last PN 22). Ambulation with QC.  Stairs- ascend/descend reciprocal pattern with HR and QC. Exercise Reps/ Time Weight/ Level Comments Completed                                           SLR with hip abduction sequence  2x10 2#    x            Prone HSC 15x2 2#    x   Prone HSC hip ER 10x2 2#  x   Prone HSC hip IR 10x2   2#  x                                            Assessment: Patient with progress toward STGs this date with R LE MMT at 4+/5 except for R knee flexion. Patient continues to have pain with prolonged standing and ambulation. She scored 6% better on LEFI this date compared to progress note 2 weeks ago. Due to current activity restrictions and increased pain since returning to work, patient will benefit from aquatic therapy to address her pain and improve strength, WB tolerance. Patients R knee extension AROM is 0 degrees (TKE) while Left knee extension AROM is -3 degrees. This is likely why patient is experiencing the feeling of hypertension at times as strength deficits (functional and gross) are not present in quadriceps. Patient would continue to benefit from skilled physical therapy services in order to address the above limitations to improve functional mobility and decrease pain for patient to complete ADLs/IADLs with least amount of compensation or restriction. STG: (to be met in 9 treatments)  ? Pain: to 2/10 at most while walking for patient to return to work and make it through entire shift with minimal pain. ONGOING   ? ROM: of Right knee extension to 0 degrees to normalize stance phase in gait. MET 9/13/22  ? Strength: of R LE to 4+/5 to improve functional strength to prepare for gait without AD. PARTIALLY MET 9/30/22  ? Function: on LEFI to <25% impairment.  ONGOING    Independent with Home Exercise Programs- MET  9/13/22  Demonstrate Knowledge of fall prevention- MET  LTG: (to be met in 18 treatments)  Patient to ambulate without AD with normal gait pattern (once 100% WB R LE) without pain or imbalance for her to return to work without restrictions. Patient to ascend/descend flight of stairs (once 100% WB R LE) with reciprocal pattern and no pain for her to resume work duties with ease. Treatment to Date:  [x] Therapeutic Exercise   58386  [] Iontophoresis: 4 mg/mL Dexamethasone Sodium Phosphate  mAmin  68887   [] Therapeutic Activity  38879 [] Vasopneumatic cold with compression  88849    [] Gait Training   70272 [] Ultrasound   84827   [] Neuromuscular Re-education  74940 [] Electrical Stimulation Unattended  82529   [] Manual Therapy  48733 [] Electrical Stimulation Attended  24137   [x] Instruction in HEP  [] Lumbar/Cervical Traction  46966   [x] Aquatic Therapy   12461 [] Cold/hotpack    [] Massage   14637      [] Dry Needling, 1 or 2 muscles  30641   [] Biofeedback, first 15 minutes   91763  [] Biofeedback, additional 15 minutes   89183 [] Dry Needling, 3 or more muscles  92139      Patient Status:     [x] Continue per initial plan of care. [x] Additional visits necessary. [x] Other:added aquatic therapy to POC. Initiating aquatics next week. Requested Frequency/Duration: 2 times per week for 23 treatments. Treatment Charges: Mins Units TIME   []  Modalities         [x]  Ther Exercise 44 3 6399-6093   []  Manual Therapy         []  Ther Activities         []  Aquatics         []  Neuromuscular         [] Vasocompression         [] Gait Training         [] Dry needling        [] 1 or 2 muscles        [] 3 or more muscles         []  Other         Total Treatment time 44 8 7226-5690        Electronically signed by: Brittany Byrd PT    If you have any questions or concerns, please don't hesitate to call. Thank you for your referral.    Physician Signature:________________________________Date:__________________  By signing above or cosigning this note, I have reviewed this plan of care and certify a need for medically necessary rehabilitation services.      *PLEASE SIGN ABOVE AND FAX BACK ALL PAGES*

## 2022-10-03 ENCOUNTER — HOSPITAL ENCOUNTER (OUTPATIENT)
Dept: PHYSICAL THERAPY | Age: 57
Setting detail: THERAPIES SERIES
Discharge: HOME | End: 2022-10-03
Payer: COMMERCIAL

## 2022-10-03 PROCEDURE — 97113 AQUATIC THERAPY/EXERCISES: CPT

## 2022-10-03 NOTE — PROGRESS NOTES
[facility] Outpatient Physical Therapy  [address]         Phone: [phone]       Fax: [phone]    Physical Therapy Daily Treatment Note    Date: 10/3/2022      Patient: Av Schmitt  : 1965  MRN: [id number]    Referring Provider:  Blanca Rico MD/ Elkin Brown MD/       Insurance: [employer] [facility] C9 2x/week x 9 weeks 22-10/14/22, NEW C9( 2x/ week for 9 weeks starts 10/15/22- )   Medical Diagnosis:  S06.0X1D (ICD-10-CM) - Concussion with loss of consciousness of 30 minutes or less, subsequent encounter   S06.5X0D (ICD-10-CM) - Traumatic subdural hemorrhage without loss of consciousness, subsequent encounter   S06. 6X9D (ICD-10-CM) - Traumatic subarachnoid hemorrhage with loss of consciousness of unspecified duration, subsequent encounter   S82.101D (ICD-10-CM) - Unspecified fracture of upper end of right tibia, subsequent encounter for closed fracture with routine healing   S00.83XD (ICD-10-CM) - Contusion of other part of head, subsequent encounter      Rehab Codes: R26.2, R 53.1  Onset date: 22                 Next 's appt.: Dr Anita Rosas 10/6/22, Ortho 22  Visit count                             Cancel/No Shows: 0/0  Date of initial visit: 22                Subjective: Patient notes minimal pain this morning. Notes no increased pain after last gym visit but also took It [place] and performed re-evaluation. Pain:  [x] Yes  [] No  Location: R knee  Pain Rating: (0-10 scale) 1/10  Pain altered Tx:  [] No  [] Yes  Action:  Comments:  Initial aquatic therapy visit. Objective:        [address] Services Exercise Log  Aquatic Knee phase 1    Date of Eval:                                Primary PT:  Diagnosis:   Things to Focus On (goals):   Surgical Precautions:  Medical Precautions:  [] C-9 dates  [] Occ Med   [] Medicare       Date 10/3/22       Visit # 14/18       Walk F/L/R 2 Laps       Marching 10x Squats        Heel toe raises 10x2       SLR F/L/R 10x2       HS Curl 10x2       Step-Ups F/L        Step Down F/L        Lunges F/L        Knee/Flex /Ext 10x2               Kickboard Ex. Iso Abd. Push-pull        Paddling                Balance        Tandem        SLS                DEEP H2O 1 Noodle       Cycling  Add      Jacks  Add      X-Country        [employer]        Achllies 2x20\"       Hamstring 2x20\"       Psoas        Quad                Cool Down 2 Laps       Pain Rating 1          Specific Instructions for Next Treatment: assess tolerance to initial aquatic therapy visit and progress as patient is able. Assessment:  [x] Progressing toward goals. Initiated aquatic therapy RLE strengthening and stability. Emphasis on pain free range of motion with all exercises performed. Educated on postural awareness and core stability with all exercises. Good overall tolerance to exercises performed with good technique and with no increased pain noted. Plan to progress with step ups and deep water exercises next visit. [] No change. [] Other:                             [x] Patient would continue to benefit from skilled physical therapy services in order to: increased RLE strength to improve stability, ambulate properly and return to PLOF. STG: (to be met in 9 treatments)  ? Pain: to 2/10 at most while walking for patient to return to work and make it through entire shift with minimal pain. ONGOING   ? ROM: of Right knee extension to 0 degrees to normalize stance phase in gait. MET 9/13/22  ? Strength: of R LE to 4+/5 to improve functional strength to prepare for gait without AD. PARTIALLY MET 9/30/22  ? Function: on LEFI to <25% impairment.  ONGOING    Independent with Home Exercise Programs- MET  9/13/22  Demonstrate Knowledge of fall prevention- MET  LTG: (to be met in 18 treatments)  Patient to ambulate without AD with normal gait pattern (once 100% WB R LE) without pain or imbalance for her to return to work without restrictions. Patient to ascend/descend flight of stairs (once 100% WB R LE) with reciprocal pattern and no pain for her to resume work duties with ease. Treatment to Date:  [x] Therapeutic Exercise   17076  [] Iontophoresis: 4 mg/mL Dexamethasone Sodium Phosphate  mAmin  80882   [] Therapeutic Activity  01485 [] Vasopneumatic cold with compression  27223    [] Gait Training   91545 [] Ultrasound   10654   [] Neuromuscular Re-education  33459 [] Electrical Stimulation Unattended  20782   [] Manual Therapy  96884 [] Electrical Stimulation Attended  92453   [x] Instruction in HEP  [] Lumbar/Cervical Traction  07627   [x] Aquatic Therapy   09696 [] Cold/hotpack    [] Massage   18005      [] Dry Needling, 1 or 2 muscles  08083   [] Biofeedback, first 15 minutes   46433  [] Biofeedback, additional 15 minutes   91863 [] Dry Needling, 3 or more muscles  63535      Patient Status:     [x] Continue per initial plan of care. [] Additional visits necessary. [x] Other:added aquatic therapy to POC. Initiating aquatics next week. Requested Frequency/Duration: 2 times per week for 23 treatments.     Treatment Charges: Mins Units TIME   []  Modalities         []  Ther Exercise      []  Manual Therapy         []  Ther Activities         [x]  Aquatics  40  3  2363-7243   []  Neuromuscular         [] Vasocompression         [] Gait Training         [] Dry needling        [] 1 or 2 muscles        [] 3 or more muscles         []  Other         Total Treatment time 40 3 7189-7655      Time In: 0825    Time Out: 3762    Electronically signed by: Melani Ly PTA

## 2022-10-05 ENCOUNTER — HOSPITAL ENCOUNTER (OUTPATIENT)
Dept: PHYSICAL THERAPY | Age: 57
Setting detail: THERAPIES SERIES
Discharge: HOME | End: 2022-10-05
Payer: COMMERCIAL

## 2022-10-05 PROCEDURE — 97113 AQUATIC THERAPY/EXERCISES: CPT

## 2022-10-05 NOTE — PROGRESS NOTES
[facility] Outpatient Physical Therapy  [address] [place] [address]. [address]         Phone: [phone]       Fax: [phone]    Physical Therapy Daily Treatment Note    Date: 10/5/2022      Patient: Harpreet Farias  : 1965  MRN: [id number]    Referring Provider:  Barbara Evans MD/ Chelita Faye MD/       Insurance: [address] [place] [address] : [phone]   Magy Portillo. Ext [phone]  Original: C9 2x/week x 9 weeks 22-10/14/22 (18 vs)     NEW C9( 2x/ week for 9 weeks starts 10/15/22-  (18 vs)    Claim # [id number]    Medical Diagnosis:  S06.0X1D (ICD-10-CM) - Concussion with loss of consciousness of 30 minutes or less, subsequent encounter   S06.5X0D (ICD-10-CM) - Traumatic subdural hemorrhage without loss of consciousness, subsequent encounter   S06. 6X9D (ICD-10-CM) - Traumatic subarachnoid hemorrhage with loss of consciousness of unspecified duration, subsequent encounter   S82.101D (ICD-10-CM) - Unspecified fracture of upper end of right tibia, subsequent encounter for closed fracture with routine healing   S00.83XD (ICD-10-CM) - Contusion of other part of head, subsequent encounter      Rehab Codes: R26.2, R 53.1  Onset date: 22                 Next 's appt.: Dr Damian Rey 10/6/22, Ortho 22  Visit count 15/18                            Cancel/No Shows: 0/0  Date of initial visit: 22                Subjective:  Reports she was very sore into the next day after initial aquatics visit but felt it was more tolerable than land therapy. To see Dr Damian Rey tomorrow AM  Pain:  [x] Yes  [] No  Location: (R) Knee/thigh Pain Rating: (0-10 scale) 3/10  Pain altered Tx:  [] No  [] Yes  Action:  Comments:  Initial aquatic therapy visit.     Objective:    [address] Services Exercise Log  Aquatic Knee phase 1    Date of Eval:    22                            Primary PT: Vilma Garcia, PT    [x] C-9 dates: Original: C9 2x/week x 9 weeks 22-10/14/22 (18 vs)     NEW C9: 2x/ week for 9 weeks starts 10/15/22- 11/11/222 (18 vs)          Date 10/3/22 10/5/22      Visit # 14/18 15/18      Walk F/L/R 2 Laps 2 Laps      Marching 10x 15x Add Lap     Squats  15x3\"      Heel toe raises 10x2 15x      SLR F/L/R 10x2 15x      HS Curl 10x2 15x      Step-Ups F/L   Add     Step Down F/L        Lunges F/L        Knee/Flex /Ext 10x2 15x              Kickboard Ex. Iso Abd. Push-pull        Paddling                Balance        Tandem        SLS                DEEP H2O 1 Noodle 1 Noodle      Cycling  1'      Jacks   Add     X-Country   Add     Hang  3'              Stretches        Achllies 2x20\" 2x20\"      Hamstring 2x20\" 2x20\"      Psoas        Quad                Cool Down 2 Laps 2 laps      Pain Rating 1 3         Specific Instructions for Next Treatment: Progress with deep water aerobics and add marching lap. Assessment:  [x] Progressing toward goals. Reviewed importance of postural awareness and emphasis on on proper technique with all exercise/activity. Performed exercise in controlled ranges and encouraged patient to avoid over working due to increased pain after last visit. Added in deep water cycling and squats as tolerated in aquatic environment. Patient tolerated well and finished with deep water unloading. [] No change. [] Other:                             [x] Patient would continue to benefit from skilled physical therapy services in order to: increased RLE strength to improve stability, ambulate properly and return to PLOF. STG: (to be met in 9 treatments)  ? Pain: to 2/10 at most while walking for patient to return to work and make it through entire shift with minimal pain. ONGOING   ? ROM: of Right knee extension to 0 degrees to normalize stance phase in gait. MET 9/13/22  ? Strength: of R LE to 4+/5 to improve functional strength to prepare for gait without AD. PARTIALLY MET 9/30/22  ? Function: on LEFI to <25% impairment. ONGOING    Independent with Home Exercise Programs- MET  9/13/22  Demonstrate Knowledge of fall prevention- MET  LTG: (to be met in 18 treatments)  Patient to ambulate without AD with normal gait pattern (once 100% WB R LE) without pain or imbalance for her to return to work without restrictions. Patient to ascend/descend flight of stairs (once 100% WB R LE) with reciprocal pattern and no pain for her to resume work duties with ease. Patient goals: to get full use of R leg and get back to work. Pt. Education:  [] Yes  [] No  [x] Reviewed Prior HEP/Ed- encouraged stretching land based days she does not have aquatic therapy  Method of Education: [x] Verbal  [x] Demo  [] Written  Comprehension of Education:  [] Verbalizes understanding. [] Demonstrates understanding. [] Needs review. [x] Demonstrates/verbalizes HEP/Ed previously given. Plan:    [x] Continue per plan of care. [x] Other: Call into  [employer] to confirm Aquatics is covered under PT C-9 or if an additional request needs sent; Also plan to verify 18 visits on each approved C-9.  Left message this PM.                              Treatment Charges: Mins Units TIME   []  Modalities         []  Ther Exercise      []  Manual Therapy         []  Ther Activities         [x]  [id number]  10  4  346 DA-698 PM   []  Neuromuscular         [] Vasocompression         [] Gait Training         [] Dry needling        [] 1 or 2 muscles        [] 3 or more muscles         []  Other         Total Treatment time 41 3       Time In: 682 PM   Time Out: 419 PM    Electronically signed by: Onofre Cerda PTA

## 2022-10-06 NOTE — CARE COORDINATION
10/06/22    Patient: Mac Villatoro                      : 1965                      MRN: [id number]                Referring Provider:  Lul Neri MD/ Salina Alpers, MD/       Insurance: [address] [place] [address] : [phone]   Eyad Tilley. Ext [phone]  Original: C9 2x/week x 9 weeks 22-10/14/22 (18 vs)     NEW C9( 2x/ week for 9 weeks starts 10/15/22-  (18 vs)     Claim # [id number]    Spoke to  Laverne at [facility] regarding current C-9's (each for 18 visits= 36 total)  Inquired on need for aquatics to be listed with PT and Laverne confirmed it does need to be listed separate with PT on the C-9. She suggested I contact the Dr office and request it be added to newest C-9 with start date of 10/15/22 and include the dates from old C-9 when aquatics was utilized. Called Dr SLIM Matias's office multiple times regarding this matter and left a message on their workers comp line (unable to reach a live person). Also faxed a letter with current C-9's (copy in chart) to the office. See copy of letter below:       [facility]  [address] [place] [address]  Ph. [phone]  Fax: [phone]    2022  Injured Worker: Patricia Gamble  : 1965  Employer: [address] [place] [address] CLAIM # [id number]    Dear Dr. Lul Neri,  You patient Patricia Gamble is currently receiving Physical Therapy at [facility]. I have attached our current authorizations requested by your office/approved by [facility]. We have recently transitioned Rachelle's care to Aquatic Therapy versus Physical Therapy as noted in her daily treatment notes and Plan of Care. However, due to this transition, we need Aquatic Therapy to be added to her C-9(s).    I spoke to Eyad Tilley., her  with [facility], and she suggested we contact your office and request Aquatics be added for the following dates on the original PT C-9: 10/3/22; 10/5/22; 10/10/22; 10/11/22    In addition, can you please ADD Aquatics to the Physical Therapy C-9 that begins 10/15/22 through 11/11/22:  Could your office please submit this letter along and an addendum to the C-9 starting 10/15/22 to include Physical Therapy and Aquatic Therapy for 2x/week for 9 weeks (18 visits), it would be greatly appreciated. Thank you. Timoteo Kwan  Physical Therapist Assistant  [address] [place] [address]. [address] [place] [address] , Dimitris Rose [address].  Phone: [phone]      **CONTACTED PATIENT REGARDING NEED FOR AQUATICS APPROVAL- CHANGED VISIT ON 10/10/22 TO [place] [facility]- ANH PLANS TO CALL TOMORROW AM AND FOLLOW UP ALSO WITH DR DE LOS SANTOS'C OFFICE. WILL ADDRESS POOL VISIT FOR 10/11/22 ON Monday 10/10/22 AS TO WHETHER TO CHANGE TO [facility].      Li Domínguez, PTA

## 2022-10-10 ENCOUNTER — HOSPITAL ENCOUNTER (OUTPATIENT)
Dept: PHYSICAL THERAPY | Age: 57
Setting detail: THERAPIES SERIES
Discharge: HOME | End: 2022-10-10
Payer: COMMERCIAL

## 2022-10-10 NOTE — CARE COORDINATION

## 2022-10-11 ENCOUNTER — APPOINTMENT (OUTPATIENT)
Dept: PHYSICAL THERAPY | Age: 57
End: 2022-10-11
Payer: COMMERCIAL

## 2022-10-11 NOTE — CARE COORDINATION
10/11/22    Patient: Mac Villatoro                      : 1965                      MRN: [id number]                Referring Provider:  Lul Neri MD/ Salina Alpers, MD/       Insurance: [address] [place] [address] : [phone]   Eyad Tilley. Ext [phone]  Original: C9 2x/week x 9 weeks 22-10/14/22 (18 vs)     NEW C9( 2x/ week for 9 weeks starts 10/15/22-  (18 vs)     Claim # [id number]    Called and left message for [employer] regarding C-9 for Aquatics. Requested call back or approval faxed to [phone]. Patient opted to wait to schedule until Aquatics is approved.     Onofre Cerda, PTA

## 2022-10-17 ENCOUNTER — OFFICE VISIT (OUTPATIENT)
Dept: NEUROLOGY | Age: 57
End: 2022-10-17
Payer: COMMERCIAL

## 2022-10-17 VITALS
DIASTOLIC BLOOD PRESSURE: 81 MMHG | WEIGHT: 149 LBS | SYSTOLIC BLOOD PRESSURE: 117 MMHG | HEIGHT: 65 IN | BODY MASS INDEX: 24.83 KG/M2 | HEART RATE: 80 BPM

## 2022-10-17 DIAGNOSIS — R41.3 MEMORY DIFFICULTIES: ICD-10-CM

## 2022-10-17 DIAGNOSIS — Z87.820 HISTORY OF TRAUMATIC BRAIN INJURY: ICD-10-CM

## 2022-10-17 DIAGNOSIS — S06.5XAA SDH (SUBDURAL HEMATOMA): Primary | ICD-10-CM

## 2022-10-17 PROCEDURE — 99203 OFFICE O/P NEW LOW 30 MIN: CPT | Performed by: NURSE PRACTITIONER

## 2022-10-17 RX ORDER — ACETAMINOPHEN 325 MG/1
TABLET ORAL
COMMUNITY

## 2022-10-17 RX ORDER — BUTALBITAL, ACETAMINOPHEN AND CAFFEINE 50; 325; 40 MG/1; MG/1; MG/1
TABLET ORAL
COMMUNITY
Start: 2022-07-29
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
## 2022-10-19 ENCOUNTER — HOSPITAL ENCOUNTER (OUTPATIENT)
Dept: PHYSICAL THERAPY | Age: 57
Setting detail: THERAPIES SERIES
Discharge: HOME | End: 2022-10-19
Payer: COMMERCIAL

## 2022-10-19 PROCEDURE — 97113 AQUATIC THERAPY/EXERCISES: CPT

## 2022-10-19 NOTE — CARE COORDINATION
Spoke to [facility] @ [facility] regarding C-9 dates. She requested I fax changes to be made to [phone]. Letter below faxed along with C-9's listed. (Copy in chart)      Saeed  [address]  [place], [address] [facility]  Ph. [phone]  Fax: [phone]    2022  Injured Worked: Martin Bynum : 1965  Employer: [employer]  Mehdi Loo Claim # [id number]    To Whom in May Concern:  I spoke with Rachelle's , [facility], today regarding current Physical/Aquatic Therapy dates on the C-9s. I am requesting the following C-9 dates be changed/updated. C-9 Tracking # [id number]- For PHYSICAL THERAPY - please have this C-9 end 2022 as this was the last visit the patient performed land Physical Therapy. (Client used 13/18 visits total)  C-9 Tracking #[id number] For AQUATIC THERAPY - requested 2x/wk x 9 weeks was approved for 8 visits total with a start date of 22- can this please be changed to 10/3/22 as this was her FIRST aquatics visit on this C-9. Per physician request C-9 Tracking # [id number] was withdrawn for PT 2x/wk x 9 weeks for 18 visits with start date of 10/15/22. If you have any questions, please contact me. If approval of dates is allowed, please fax updated C-9's to [phone]. Thank you. Sincerely,    Marquis Alfaro  Physical Therapist Assistant  [address] [facility] [place].  [address], [address] [facility]  Phone: [phone]

## 2022-10-19 NOTE — PROGRESS NOTES
509 [facility] Outpatient Physical Therapy  [address] [facility]. [address]         Phone: [phone]       Fax: [phone]    Physical Therapy Daily Treatment Note    Date: 10/19/2022      Patient: Lexie Boggs  : 1965  MRN: [id number]    Referring Provider:  Annalisa Rangel MD/ Laurie Phoenix MD/    Insurance: [address] [place] [address] : [phone]   Anabela Kee. Ext [id number] Claim # [id number]  Original: C9 2x/week x 9 weeks PHYSICAL THERAPY 22-22 (18 vs)- 13 USED     CURRENT C-9 2x/ week for [address] [facility] THERAPY 10/3/22-22    **Request sent via Fax to [facility] for end/start dates to change 10/19/22- see Care Coordination note for specifics**    Medical Diagnosis:  S06.0X1D (ICD-10-CM) - Concussion with loss of consciousness of 30 minutes or less, subsequent encounter   S06.5X0D (ICD-10-CM) - Traumatic subdural hemorrhage without loss of consciousness, subsequent encounter   S06. 6X9D (ICD-10-CM) - Traumatic subarachnoid hemorrhage with loss of consciousness of unspecified duration, subsequent encounter   S82.101D (ICD-10-CM) - Unspecified fracture of upper end of right tibia, subsequent encounter for closed fracture with routine healing   S00.83XD (ICD-10-CM) - Contusion of other part of head, subsequent encounter      Rehab Codes: R26.2, R 53.1  Onset date: 22        Date of initial visit: 22            Next 's appt.: Dr Arpit Dunaway 22, Ortho 22    Visit count  (3/8 AQUA C-9) (used  PT C-9)                           Cancel/No Shows: 1/0               Subjective:  Saw Neuro Monday - ordered brain MRI due to difficulty finding words. Having headaches 1-2x/week but denies vertigo/dizziness. Patient reports she took it easy at work today so pain is minimal. Notes increased stiffness with cooler/damp weather.    Pain:  [x] Yes  [] No  Location: (R) Knee/thigh Pain Rating: (0-10 scale) 3/10  Pain altered Tx:  [] No  [] Yes  Action:  Comments: Initial aquatic therapy visit. Objective:    [facility] Exercise Log  Aquatic Knee phase 1    Date of Eval:    8/18/22                            Primary PT: Francine Mcknight, PT    [x] C-9 dates: Original: C9 2x/week x 9 weeks PHYSICAL THERAPY 8/11/22-9/30/22 (18 vs)- 13 USED     CURRENT C-9 2x/ week for [address] [facility] THERAPY 10/3/22-11/18/22          Date 10/3/22 10/5/22 10/19/22     Visit # 14/21  (1/8) 15/21  (2/8) 16/21  (3/8)     Walk F/L/R 2 Laps 2 Laps 2 Laps     Marching 10x 15x 2 Laps     Squats  15x3\" Low Box 10x5\"     Heel toe raises 10x2 15x 10x     SLR F/L/R 10x2 15x 10x     HS Curl 10x2 15x 10x     Step-Ups F/L    Add    Step Down F/L        Lunges F/L        Knee/Flex /Ext 10x2 15x 10x             Kickboard Ex. Iso Abd. Push-pull        Paddling                Balance        Tandem        SLS                DEEP H2O 1 Noodle 1 Noodle 1 Noodle     Cycling  1' 2'     Jacks   1'     X-Country   1'     Hang  3'              Stretches        Achllies 2x20\" 2x20\" 2x20\"     Hamstring 2x20\" 2x20\" 2x20\"     Psoas        Quad                Cool Down 2 Laps 2 laps 2 Laps     Pain Rating 1 3 2        Specific Instructions for Next Treatment: Progress with step ups and increase reps/speed to tolerance. Assessment:  [x] Progressing toward goals. Emphasis on on proper technique with all exercise/activity. Resumed exercise and encouraged controlled ranges to avoid over working/increasing pain. Soreness in R knee noted with marching laps and after WB LE exercise- notes burning at right lateral knee but denies increased pain. Progressed with deep water aerobic exercise to promote improved endruance and finished with deep water unloading for pain relief. [] No change.                           [] Other:                             [x] Patient would continue to benefit from skilled physical therapy services in order to: increased RLE strength to improve stability, ambulate properly and return to PLOF. STG: (to be met in 9 treatments)  ? Pain: to 2/10 at most while walking for patient to return to work and make it through entire shift with minimal pain. ONGOING   ? ROM: of Right knee extension to 0 degrees to normalize stance phase in gait. MET 9/13/22  ? Strength: of R LE to 4+/5 to improve functional strength to prepare for gait without AD. PARTIALLY MET 9/30/22  ? Function: on LEFI to <25% impairment. ONGOING    Independent with Home Exercise Programs- MET  9/13/22  Demonstrate Knowledge of fall prevention- MET  LTG: (to be met in 18 treatments)  Patient to ambulate without AD with normal gait pattern (once 100% WB R LE) without pain or imbalance for her to return to work without restrictions. Patient to ascend/descend flight of stairs (once 100% WB R LE) with reciprocal pattern and no pain for her to resume work duties with ease. Patient goals: to get full use of R leg and get back to work. Pt. Education:  [] Yes  [] No  [x] Reviewed Prior HEP/Ed- encouraged stretching land based days she does not have aquatic therapy  Method of Education: [x] Verbal  [x] Demo  [] Written  Comprehension of Education:  [] Verbalizes understanding. [] Demonstrates understanding. [] Needs review. [x] Demonstrates/verbalizes HEP/Ed previously given. Plan:    [x] Continue per plan of care.               [] Other:                               Treatment Charges: Mins Units TIME   []  Modalities         []  Ther Exercise      []  Manual Therapy         []  Ther Activities         [x]  [id number]  07  6  778 TU-147 PM   []  Neuromuscular         [] Vasocompression         [] Gait Training         [] Dry needling        [] 1 or 2 muscles        [] 3 or more muscles         []  Other         Total Treatment time 46 3       Time In: 356 PM   Time Out: 004 PM    Electronically signed by: Pio Moon PTA

## 2022-10-20 ENCOUNTER — HOSPITAL ENCOUNTER (OUTPATIENT)
Dept: PHYSICAL THERAPY | Age: 57
Setting detail: THERAPIES SERIES
Discharge: HOME | End: 2022-10-20
Payer: COMMERCIAL

## 2022-10-20 PROCEDURE — 97113 AQUATIC THERAPY/EXERCISES: CPT

## 2022-10-20 NOTE — FLOWSHEET NOTE
509 [facility] Outpatient Physical Therapy  [address] [facility]. [address]         Phone: [phone]       Fax: [phone]    Physical Therapy Daily Treatment Note    Date: 10/20/2022      Patient: Alex Roldan  : 1965  MRN: [id number]    Referring Provider:  Conrado Arriaga MD/ Genoveva Arce MD/    Insurance: [address] [place] [address] : [phone]   Marbella Little. Ext [id number] Claim # [id number]  Original: C9 2x/week x 9 weeks PHYSICAL THERAPY 22-22 (18 vs)- 13 USED     CURRENT C-9 2x/ week for [address] [facility] THERAPY 10/3/22-22    **Request sent via Fax to [facility] for end/start dates to change 10/19/22- see Care Coordination note for specifics**    Medical Diagnosis:  S06.0X1D (ICD-10-CM) - Concussion with loss of consciousness of 30 minutes or less, subsequent encounter   S06.5X0D (ICD-10-CM) - Traumatic subdural hemorrhage without loss of consciousness, subsequent encounter   S06. 6X9D (ICD-10-CM) - Traumatic subarachnoid hemorrhage with loss of consciousness of unspecified duration, subsequent encounter   S82.101D (ICD-10-CM) - Unspecified fracture of upper end of right tibia, subsequent encounter for closed fracture with routine healing   S00.83XD (ICD-10-CM) - Contusion of other part of head, subsequent encounter      Rehab Codes: R26.2, R 53.1  Onset date: 22        Date of initial visit: 22            Next 's appt.: Dr Luz Zoroastrianism 22, Ortho 22    Visit count  ( AQUA C-9) (used  PT C-9)                           Cancel/No Shows: 1/0             Subjective:  Pt reports feeling good this morning with no pain in right knee at this time. States did have some burning in lateral right knee after yesterday's appointment but better by this morning. Reports using the cane for long distance walks and walker when leaving work due to fatigue and long hours.   Still uses cane for community ambulation and first thing in the morning due to knee stiffness when she wakes. Pain:  [x] Yes  [] No  Location: (R) Knee/thigh Pain Rating: (0-10 scale) 0/10  Pain altered Tx:  [x] No  [] Yes  Action:  Comments:      Objective:    [facility] Exercise Log  Aquatic Knee phase 1    Date of Eval:    8/18/22                            Primary PT: Susanna Coley, PT    [x] C-9 dates: Original: C9 2x/week x 9 weeks PHYSICAL THERAPY 8/11/22-9/30/22 (18 vs)- 13 USED     CURRENT C-9 2x/ week for [address] [facility] THERAPY 10/3/22-11/18/22      Date 10/3/22 10/5/22 10/19/22 10/20/22    Visit # 14/21  (1/8) 15/21  (2/8) 16/21  (3/8) 17/21  (4/8)    Walk F/L/R 2 Laps 2 Laps 2 Laps 2 Laps    Marching 10x 15x 2 Laps 2 Laps    Squats  15x3\" Low Box 10x5\" Low box  10x5\"    Heel toe raises 10x2 15x 10x 10x    SLR F/L/R 10x2 15x 10x 10x    HS Curl 10x2 15x 10x 10x    Step-Ups F/L    Low 10x    Step Down F/L        Lunges F/L        Knee/Flex /Ext 10x2 15x 10x 10x            Kickboard Ex. Iso Abd. Push-pull        Paddling                Balance        Tandem        SLS                DEEP H2O 1 Noodle 1 Noodle 1 Noodle 1 Noodle    Cycling  1' 2' 2'    Jacks   1' 2'    X-Country   1' 2'    Hang  3'  3'            Stretches        Achllies 2x20\" 2x20\" 2x20\" 2x20\"    Hamstring 2x20\" 2x20\" 2x20\" 2x20\"    Psoas        Quad                Cool Down 2 Laps 2 laps 2 Laps 2 Laps  Breast stroke    Pain Rating 1 3 2 0       Specific Instructions for Next Treatment: Increase reps/speed throughout program and increase step height next visit. Assessment:  [x] Progressing toward goals. Continued with exercise above for knee ROM, strengthening and stability. Good tolerance with no increased pain during treatment per patient. Added forward and lateral step ups for LE strengthening, increased time on deep water jacks and cross-country for LE endurance and progressed cool down laps for trunk stability.   Good technique with all exercises and no UE support with forward step ups to further challenge balance and LE stability. [] No change. [] Other:                             [x] Patient would continue to benefit from skilled physical therapy services in order to: increased RLE strength to improve stability, ambulate properly and return to PLOF. STG: (to be met in 9 treatments)  ? Pain: to 2/10 at most while walking for patient to return to work and make it through entire shift with minimal pain. ONGOING   ? ROM: of Right knee extension to 0 degrees to normalize stance phase in gait. MET 9/13/22  ? Strength: of R LE to 4+/5 to improve functional strength to prepare for gait without AD. PARTIALLY MET 9/30/22  ? Function: on LEFI to <25% impairment. ONGOING    Independent with Home Exercise Programs- MET  9/13/22  Demonstrate Knowledge of fall prevention- MET  LTG: (to be met in 18 treatments)  Patient to ambulate without AD with normal gait pattern (once 100% WB R LE) without pain or imbalance for her to return to work without restrictions. Patient to ascend/descend flight of stairs (once 100% WB R LE) with reciprocal pattern and no pain for her to resume work duties with ease. Patient goals: to get full use of R leg and get back to work. Pt. Education:  [] Yes  [x] No  [] Reviewed Prior HEP/Ed-   Method of Education: [] Verbal  [] Demo  [] Written  Comprehension of Education:  [] Verbalizes understanding. [] Demonstrates understanding. [] Needs review. [x] Demonstrates/verbalizes HEP/Ed previously given. Plan:    [x] Continue per plan of care.               [] Other:                               Treatment Charges: Mins Units TIME   []  Modalities         []  Ther Exercise      []  Manual Therapy         []  Ther Activities         [x]  Aquatics  44  6 3909- 0131   []  Neuromuscular         [] Vasocompression         [] Gait Training         [] Dry needling        [] 1 or 2 muscles        [] 3 or more muscles         []  Other         Total Treatment time 44 3       3' Deep water hang non-billable time.     Time In: 1465   Time Out: 5830    Electronically signed by: Berna Figueredo PTA

## 2022-10-25 ENCOUNTER — HOSPITAL ENCOUNTER (OUTPATIENT)
Dept: PHYSICAL THERAPY | Age: 57
Setting detail: THERAPIES SERIES
Discharge: HOME | End: 2022-10-25
Payer: COMMERCIAL

## 2022-10-25 NOTE — FLOWSHEET NOTE
[] [facility]) - [facility] & Therapy  [address] [facility] [address]  [place]: [phone]   F: [phone]     Physical Therapy Cancel/No Show note    Date: 10/25/2022  Patient: Jodi Paulino  : 1965  MRN: [id number]    Visit count  (4/ AQUA C-9) (used  PT C-9 done as of 22)                           Cancel/No Shows: 2/0    For today's appointment patient:    [x]  Cancelled    [] Rescheduled appointment    [] No-show     Reason given by patient:    [x]  Patient ill    []  Conflicting appointment    [] No transportation      [] Conflict with work    [] No reason given    [] Weather related    [] COVID-19    [] Other:      Comments:       [x] Next appointment was confirmed    Electronically signed by: Samuel Shoemaker PTA

## 2022-10-27 ENCOUNTER — HOSPITAL ENCOUNTER (OUTPATIENT)
Dept: PHYSICAL THERAPY | Age: 57
Setting detail: THERAPIES SERIES
Discharge: HOME | End: 2022-10-27
Payer: COMMERCIAL

## 2022-10-27 PROCEDURE — 97113 AQUATIC THERAPY/EXERCISES: CPT

## 2022-10-27 NOTE — FLOWSHEET NOTE
[facility] Outpatient Physical Therapy  [address] [facility]         Phone: [phone]       Fax: [phone]    Physical Therapy Daily Treatment Note    Date: 10/27/2022      Patient: Laila Abdi  : 1965  MRN: [id number]    Referring Provider:  Hari Peacock MD/ Ramesh Vang MD/    Insurance: [employer] : [phone]   Toni Leung. Ext [id number] Claim # [id number]  Original: C9 2x/week x 9 weeks PHYSICAL THERAPY 22-22 (18 vs)- 13 USED     CURRENT C-9 2x/ week for 1 [facility] THERAPY 10/3/22-22    **Request sent via Fax to [facility] for end/start dates to change 10/19/22- see Care Coordination note for specifics**    Medical Diagnosis:  S06.0X1D (ICD-10-CM) - Concussion with loss of consciousness of 30 minutes or less, subsequent encounter   S06.5X0D (ICD-10-CM) - Traumatic subdural hemorrhage without loss of consciousness, subsequent encounter   S06. 6X9D (ICD-10-CM) - Traumatic subarachnoid hemorrhage with loss of consciousness of unspecified duration, subsequent encounter   S82.101D (ICD-10-CM) - Unspecified fracture of upper end of right tibia, subsequent encounter for closed fracture with routine healing   S00.83XD (ICD-10-CM) - Contusion of other part of head, subsequent encounter      Rehab Codes: R26.2, R 53.1  Onset date: 22        Date of initial visit: 22            Next 's appt.: Dr Jan Jain 22, Ortho 22    Visit count  ( AQUA C-9) (used  PT C-9)                           Cancel/No Shows: 2/0             Subjective:  Has trouble lifting heavy items- like a case of water. Been walking without cane at home and uses cane in public. 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  To see Ortho  next week  Pain:  [x] Yes  [] No  Location: (R) Knee lateral > medial Pain Rating: (0-10 scale) 0-1/10  Pain altered Tx:  [x] No  [] Yes  Action:  Comments: Objective:    [facility] Exercise Log  Aquatic Knee phase 1    Date of Eval:    8/18/22                            Primary PT: Tej Boland, PT    [x] C-9 dates: Original: C9 2x/week x 9 weeks PHYSICAL THERAPY 8/11/22-9/30/22 (18 vs)- 13 USED     CURRENT C-9 2x/ week for [address] [facility] THERAPY 10/3/22-11/18/22      Date 10/19/22 10/20/22 10/27/22    Visit # 16/21  (3/8) 17/21  (4/8) 18/21  (5/8)    Walk F/L/R 2 Laps 2 Laps 2 Laps    Marching 2 Laps 2 Laps 2 Laps       Low Box    Squats Low Box 10x5\" Low box  10x5\" 10x5\"    Heel toe raises 10x 10x 10x    SLR F/L/R 10x 10x 10x    HS Curl 10x 10x 10x    Step-Ups F/L  Low 10x Tall F 10x +Lat   Step Down F/L       Lunges F/L       Knee/Flex /Ext 10x 10x 10x           Kickboard Ex. Med    Iso Abd.    10x5\"    Push-pull   10x    Paddling              Balance       Tandem       SLS              DEEP H2O 1 Noodle 1 Noodle 1 Noodle    Cycling 2' 2' 2'    Jacks 1' 2' 2'    X-Country 1' 2' 2'    Hang  3' 2'           Stretches       Achllies 2x20\" 2x20\" 2x20\"    Hamstring 2x20\" 2x20\" 2x20\"    Psoas       Quad              Cool Down 2 Laps 2 Laps  Breast stroke 2 Laps Breast Stroke    Pain Rating 2 0 0-1       Specific Instructions for Next Treatment: Increase reps and resume lateral step ups on tall box    Assessment:  [x] Progressing toward goals. Progressed WB with LE exercise to step level and increased step height with step ups. Also added kickboard activity to challenge core stability and balance. Finished with deep water aerobic activity to assist with endurance/stamina. Overall patient tolerated well                          [] No change. [] Other:                             [x] Patient would continue to benefit from skilled physical therapy services in order to: increased RLE strength to improve stability, ambulate properly and return to PLOF. STG: (to be met in 9 treatments)  ?  Pain: to 2/10 at most while walking for patient to return to work and make it through entire shift with minimal pain. ONGOING   ? ROM: of Right knee extension to 0 degrees to normalize stance phase in gait. MET 9/13/22  ? Strength: of R LE to 4+/5 to improve functional strength to prepare for gait without AD. PARTIALLY MET 9/30/22  ? Function: on LEFI to <25% impairment. ONGOING    Independent with Home Exercise Programs- MET  9/13/22  Demonstrate Knowledge of fall prevention- MET  LTG: (to be met in 18 treatments)  Patient to ambulate without AD with normal gait pattern (once 100% WB R LE) without pain or imbalance for her to return to work without restrictions. Patient to ascend/descend flight of stairs (once 100% WB R LE) with reciprocal pattern and no pain for her to resume work duties with ease. Patient goals: to get full use of R leg and get back to work. Pt. Education:  [x] Yes  [] No  [] Reviewed Prior HEP/Ed-   Method of Education: [x] Verbal  [x] Demo  [x] Written- aquatics program due to patient able to complete exercise in the pool at her work- ISSUED HANDOUT WITH ALL EXERCISE LISTED ABOVE  Comprehension of Education:  [x] Verbalizes understanding. [x] Demonstrates understanding. [] Needs review. [x] Demonstrates/verbalizes HEP/Ed previously given. Plan:    [x] Continue per plan of care.               [] Other:                               Treatment Charges: Mins Units TIME in/Out   []  Modalities         []  Ther Exercise      []  Manual Therapy         []  Ther Activities         [x]  Aquatics  41  3  5707 IT-4380 AM   []  Neuromuscular         [] Vasocompression         [] Gait Training         [] Dry needling        [] 1 or 2 muscles        [] 3 or more muscles         []  Other         Total Treatment time 41 3           Aquatics Time In: 5954 AM   Time Out: 4264 AM    Electronically signed by: Evelyn Dockery PTA

## 2022-10-31 ENCOUNTER — APPOINTMENT (OUTPATIENT)
Dept: PHYSICAL THERAPY | Age: 57
End: 2022-10-31
Payer: COMMERCIAL

## 2022-11-01 ENCOUNTER — HOSPITAL ENCOUNTER (OUTPATIENT)
Dept: PHYSICAL THERAPY | Age: 57
Setting detail: THERAPIES SERIES
End: 2022-11-01
Payer: COMMERCIAL

## 2022-11-01 ENCOUNTER — HOSPITAL ENCOUNTER (OUTPATIENT)
Dept: PHYSICAL THERAPY | Age: 57
Setting detail: THERAPIES SERIES
Discharge: HOME | End: 2022-11-01
Payer: COMMERCIAL

## 2022-11-01 PROCEDURE — 97113 AQUATIC THERAPY/EXERCISES: CPT

## 2022-11-01 NOTE — FLOWSHEET NOTE
[facility] Outpatient Physical Therapy  [address] [facility] [address]         Phone: [phone]       Fax: [phone]    Physical Therapy Daily Treatment Note    Date: 2022      Patient: Lionel Alvarado  : 1965  MRN: [id number]    Referring Provider:  David Kearns MD/ Viktoriya Gallagher MD/    Insurance: [employer] : [phone]   Donelda Dakin. Ext [id number] Claim # [id number]  Original: C9 2x/week x 9 weeks PHYSICAL THERAPY 22-22 (18 vs)- 13 USED     [facility]    **Request sent via Fax to [facility] for end/start dates to change 10/19/22- see Care Coordination note for specifics**    Medical Diagnosis:  S06.0X1D (ICD-10-CM) - Concussion with loss of consciousness of 30 minutes or less, subsequent encounter   S06.5X0D (ICD-10-CM) - Traumatic subdural hemorrhage without loss of consciousness, subsequent encounter   S06. 6X9D (ICD-10-CM) - Traumatic subarachnoid hemorrhage with loss of consciousness of unspecified duration, subsequent encounter   S82.101D (ICD-10-CM) - Unspecified fracture of upper end of right tibia, subsequent encounter for closed fracture with routine healing   S00.83XD (ICD-10-CM) - Contusion of other part of head, subsequent encounter      Rehab Codes: R26.2, R 53.1  Onset date: 22        Date of initial visit: 22            Next 's appt.: Dr Kylah Barbour 22, Ortho 22    Visit count  ( AQUA C-9) (used  PT C-9)                           Cancel/No Shows: 2/0             Subjective: To see ortho tomorrow in AM. States she has still been using walker to get into work due to long work but not using at work. Still uses cane within community but arrived to therapy this date without it.  Noted some lower back soreness after last visit and states right lateral knee remains sore- pain can increase up to 3-4/10 after working; states she has been pushing herself at work and unloaded a couple washers which she had not been able to do. Has been using pool at work for aquatic exercise. Difficulty with bending with heavy lifting- still avoids lifting a case of water. Reports she is to have a brain MRI soon along with a pysch/neuro eval due to memory issues    Pain:  [x] Yes  [] No  Location: (R) Knee lateral > medial Pain Rating: (0-10 scale) 1-2/10  Pain altered Tx:  [x] No  [] Yes  Action:       Objective:    [address] [facility] Exercise Log  Aquatic Knee phase 1    Date of Eval:    8/18/22                            Primary PT: Milo Kulkarni, PT    [x] C-9 dates: Original: C9 2x/week x 9 weeks PHYSICAL THERAPY 8/11/22-9/30/22 (18 vs)- 13 USED     CURRENT C-9 2x/ week for [address] [facility] THERAPY 10/3/22-11/18/22      Date 10/19/22 10/20/22 10/27/22 11/1/22    Visit # 16/21  (3/8) 17/21  (4/8) 18/21  (5/8) 19/21  (6/8)    Walk F/L/R 2 Laps 2 Laps 2 Laps 2 Laps    Marching 2 Laps 2 Laps 2 Laps 2 Laps       Low Box Low Box    Squats Low Box 10x5\" Low box  10x5\" 10x5\" 10x5\"    Heel toe raises 10x 10x 10x 10x    SLR F/L/R 10x 10x 10x 10x    HS Curl 10x 10x 10x 10x    Step-Ups F/L  Low 10x Tall F 10x Tall 10x+Lat    Step Down F/L        Lunges F/L    Low F 10x    Knee/Flex /Ext 10x 10x 10x 10x            Kickboard Ex. Med Med    Iso Abd.    10x5\" 10x5\"    Push-pull   10x 15x    Paddling    10x            Balance        Tandem        SLS                DEEP H2O 1 Noodle 1 Noodle 1 Noodle 1 Noodle    Cycling 2' 2' 2' 2'    Jacks 1' 2' 2' 2'    X-Country 1' 2' 2' 2'    Hang  3' 2' 2'            Stretches        Achllies 2x20\" 2x20\" 2x20\" 2x20\"    Hamstring 2x20\" 2x20\" 2x20\" 2x20\"    Psoas        Quad                Cool Down 2 Laps 2 Laps  Breast stroke 2 Laps Breast Stroke 2 Laps Breast Stroke    Pain Rating 2 0 0-1 1-2       Specific Instructions for Next Treatment: Increase reps/speed    Assessment:  [x] Progressing toward goals.   Resumed lateral step ups this date without issue. Added forward lunges and kickbord paddling to challenge balance and stability. Patient tolerated well; denies increased pain throughout program at 40% WB in aquatic environment. Continued to encourage increased speed while unloaded in deep water for for improved endurance. [] No change. [] Other:                             [x] Patient would continue to benefit from skilled physical therapy services in order to: increased RLE strength to improve stability, ambulate properly and return to PLOF. STG: (to be met in 9 treatments)  ? Pain: to 2/10 at most while walking for patient to return to work and make it through entire shift with minimal pain. ONGOING   ? ROM: of Right knee extension to 0 degrees to normalize stance phase in gait. MET 9/13/22  ? Strength: of R LE to 4+/5 to improve functional strength to prepare for gait without AD. PARTIALLY MET 9/30/22  ? Function: on LEFI to <25% impairment. ONGOING    Independent with Home Exercise Programs- MET  9/13/22  Demonstrate Knowledge of fall prevention- MET  LTG: (to be met in 18 treatments)  Patient to ambulate without AD with normal gait pattern (once 100% WB R LE) without pain or imbalance for her to return to work without restrictions. Patient to ascend/descend flight of stairs (once 100% WB R LE) with reciprocal pattern and no pain for her to resume work duties with ease. Patient goals: to get full use of R leg and get back to work. Pt. Education:  [] Yes  [] No  [x] Reviewed Prior HEP/Ed-   Method of Education: [x] Verbal  [x] Demo  [] Written- discussed progressions of Indep aquatic program- encouraged activity 3-5x/wk and can perform just walking/deep water aerobics 2x/week with full program 3x/week  Comprehension of Education:  [x] Verbalizes understanding. [x] Demonstrates understanding. [] Needs review. [x] Demonstrates/verbalizes HEP/Ed previously given. Plan:    [x] Continue per plan of care.               [] Other:                               Treatment Charges: Mins Units TIME in/Out   []  Modalities         []  Ther Exercise      []  Manual Therapy         []  Ther Activities         [x]  [id number]  27  8  054 RW-666 AM   []  Neuromuscular         [] Vasocompression         [] Gait Training         [] Dry needling        [] 1 or 2 muscles        [] 3 or more muscles         []  Other         Total Treatment time 47 3           Aquatics Time In: 480 AM  Time Out: 011 AM    Electronically signed by: Janene Reed PTA

## 2022-11-02 ENCOUNTER — HOSPITAL ENCOUNTER (OUTPATIENT)
Dept: PHYSICAL THERAPY | Age: 57
Setting detail: THERAPIES SERIES
Discharge: HOME | End: 2022-11-02
Payer: COMMERCIAL

## 2022-11-02 PROCEDURE — 97113 AQUATIC THERAPY/EXERCISES: CPT

## 2022-11-02 NOTE — FLOWSHEET NOTE
[address]  [address] [facility] [address] [facility]         Phone: [phone]       Fax: [phone]    Physical Therapy Daily Treatment Note    Date: 2022      Patient: Laila Abdi  : 1965  MRN: [id number]    Referring Provider:  Hari Peacock MD/ Ramesh Vang MD/    Insurance: [address] [place] [address] : [phone]   Toni Leung. Ext [id number] Claim # [id number]  Original: C9 2x/week x 9 weeks PHYSICAL THERAPY 22-22 (18 vs)- 13 USED     [facility]    **Request sent via Fax to [facility] for end/start dates to change 10/19/22- see Care Coordination note for specifics**    Medical Diagnosis:  S06.0X1D (ICD-10-CM) - Concussion with loss of consciousness of 30 minutes or less, subsequent encounter   S06.5X0D (ICD-10-CM) - Traumatic subdural hemorrhage without loss of consciousness, subsequent encounter   S06. 6X9D (ICD-10-CM) - Traumatic subarachnoid hemorrhage with loss of consciousness of unspecified duration, subsequent encounter   S82.101D (ICD-10-CM) - Unspecified fracture of upper end of right tibia, subsequent encounter for closed fracture with routine healing   S00.83XD (ICD-10-CM) - Contusion of other part of head, subsequent encounter      Rehab Codes: R26.2, R 53.1  Onset date: 22        Date of initial visit: 22            Next 's appt.: Dr Jan Jain 22, Ortho 22    Visit count  ( AQUA C-9) (used  PT C-9)                           Cancel/No Shows: 2/0             Subjective:  Saw ortho this AM- happy with her progress. To follow up in 6 months or sooner if needed. Patient reports she has not been using cane today and been on her feet a lot with minimal soreness. No changes to restrictions for work- to be addressed by Dr Jan Jain 22; also has MRI 22. Scheduled her neuro/pysch consult for 22.     Pain:  [x] Yes  [] No  Location: (R) Knee lateral > medial Pain Rating: (0-10 scale) 2/10  Pain altered Tx:  [x] No  [] Yes  Action:       Objective:    [address] [facility] Exercise Log  Aquatic Knee phase 1    Date of Eval:    8/18/22                            Primary PT: Migel Almanza, PT    [x] C-9 dates: Original: C9 2x/week x 9 weeks PHYSICAL THERAPY 8/11/22-9/30/22 (18 vs)- 13 USED     CURRENT C-9 2x/ week for [address] [facility] THERAPY 10/3/22-11/18/22      Date 10/19/22 10/20/22 10/27/22 11/1/22 11/2/22   Visit # 16/21  (3/8) 17/21  (4/8) 18/21  (5/8) 19/21  (6/8) 20/21  (7/8)   Walk F/L/R 2 Laps 2 Laps 2 Laps 2 Laps 2 Laps   Marching 2 Laps 2 Laps 2 Laps 2 Laps 2 Laps      Low Box Low Box Low Box   Squats Low Box 10x5\" Low box  10x5\" 10x5\" 10x5\" 15x5\"   Heel toe raises 10x 10x 10x 10x 15x   SLR F/L/R 10x 10x 10x 10x 15x   HS Curl 10x 10x 10x 10x 15x   Step-Ups F/L  Low 10x Tall F 10x Tall 10x+Lat Tall 10x   Step Down F/L        Lunges F/L    Low F 10x Low F 10x   Knee/Flex /Ext 10x 10x 10x 10x 15x           Kickboard Ex. Med Med Med   Iso Abd.    10x5\" 10x5\" 10x5\"   Push-pull   10x 15x 15x   Paddling    10x 15x           Balance        Tandem        SLS                DEEP H2O 1 Noodle 1 Noodle 1 Noodle 1 Noodle 1 Noodle   Cycling 2' 2' 2' 2' 2'   Jacks 1' 2' 2' 2' 2'   X-Country 1' 2' 2' 2' 2'   Hang  3' 2' 2'            Stretches        Achllies 2x20\" 2x20\" 2x20\" 2x20\" 2x20\"   Hamstring 2x20\" 2x20\" 2x20\" 2x20\" 2x20\"   Psoas        Quad                Cool Down 2 Laps 2 Laps  Breast stroke 2 Laps Breast Stroke 2 Laps Breast Stroke 2 Laps Breast Stroke   Pain Rating 2 0 0-1 1-2 2      Specific Instructions for Next Treatment: DC next visit; review HEP as needed (patient has access to pool at work)    Assessment:  [x] Progressing toward goals. Increased reps as noted above with LE exercise; patient tolerated well. Mild difficulty with step ups loading onto R LE but denies increased pain.  Overall patient strength, stability and endurance has improved with aquatic program; able to complete program with little to no UE support. [] No change. [] Other:                             [x] Patient would continue to benefit from skilled physical therapy services in order to: increased RLE strength to improve stability, ambulate properly and return to PLOF. STG: (to be met in 9 treatments)  ? Pain: to 2/10 at most while walking for patient to return to work and make it through entire shift with minimal pain. ONGOING   ? ROM: of Right knee extension to 0 degrees to normalize stance phase in gait. MET 9/13/22  ? Strength: of R LE to 4+/5 to improve functional strength to prepare for gait without AD. PARTIALLY MET 9/30/22  ? Function: on LEFI to <25% impairment. ONGOING    Independent with Home Exercise Programs- MET  9/13/22  Demonstrate Knowledge of fall prevention- MET  LTG: (to be met in 18 treatments)  Patient to ambulate without AD with normal gait pattern (once 100% WB R LE) without pain or imbalance for her to return to work without restrictions. Patient to ascend/descend flight of stairs (once 100% WB R LE) with reciprocal pattern and no pain for her to resume work duties with ease. Patient goals: to get full use of R leg and get back to work. Pt. Education:  [] Yes  [] No  [x] Reviewed Prior HEP/Ed-   Method of Education: [x] Verbal  [x] Demo  [] Written- discussed progressions of Indep aquatic program- encouraged activity 3-5x/wk and can perform just walking/deep water aerobics 2x/week with full program 3x/week  Comprehension of Education:  [x] Verbalizes understanding. [x] Demonstrates understanding. [] Needs review. [x] Demonstrates/verbalizes HEP/Ed previously given. Plan:    [x] Continue per plan of care.               [] Other:                               Treatment Charges: Mins Units TIME in/Out   []  Modalities         []  Ther Exercise      [] Manual Therapy         []  Ther Activities         [x]  Aquatics  46  3  318 PM -404 PM   []  Neuromuscular         [] Vasocompression         [] Gait Training         [] Dry needling        [] 1 or 2 muscles        [] 3 or more muscles         []  Other         Total Treatment time 46 3           Aquatics Time In: 315 PM  Time Out: 407 PM    Electronically signed by: Lizzeth Rivas PTA

## 2022-11-03 ENCOUNTER — APPOINTMENT (OUTPATIENT)
Dept: PHYSICAL THERAPY | Age: 57
End: 2022-11-03
Payer: COMMERCIAL

## 2022-11-08 ENCOUNTER — HOSPITAL ENCOUNTER (OUTPATIENT)
Dept: PHYSICAL THERAPY | Age: 57
Setting detail: THERAPIES SERIES
Discharge: HOME | End: 2022-11-08
Payer: COMMERCIAL

## 2022-11-08 PROCEDURE — 97110 THERAPEUTIC EXERCISES: CPT

## 2022-11-08 PROCEDURE — 97113 AQUATIC THERAPY/EXERCISES: CPT

## 2022-11-08 NOTE — FLOWSHEET NOTE
[address] [facility] Outpatient Physical Therapy  [facility] [facility]. [address]         Phone: [phone]       Fax: [phone]    Physical Therapy Daily Treatment Note- DISCHARGE    Date: 2022      Patient: Nicolas Carlson  : 1965  MRN: [id number]    Referring Provider:  Herve Mcmanus MD/ Indiana Pete MD/    Insurance: [facility] : [phone]   [employer]. Ext [id number] Claim # [id number]  Original: C9 2x/week x 9 weeks PHYSICAL THERAPY 22-22 (18 vs)- 13 USED     CURRENT C-9 2x/ week for [address] [facility] THERAPY 10/3/22-22    **Request sent [facility] Fax to [facility] for end/start dates to change 10/19/22- see Care Coordination note for specifics**    Medical Diagnosis:  S06.0X1D (ICD-10-CM) - Concussion with loss of consciousness of 30 minutes or less, subsequent encounter   S06.5X0D (ICD-10-CM) - Traumatic subdural hemorrhage without loss of consciousness, subsequent encounter   S06. 6X9D (ICD-10-CM) - Traumatic subarachnoid hemorrhage with loss of consciousness of unspecified duration, subsequent encounter   S82.101D (ICD-10-CM) - Unspecified fracture of upper end of right tibia, subsequent encounter for closed fracture with routine healing   S00.83XD (ICD-10-CM) - Contusion of other part of head, subsequent encounter      Rehab Codes: R26.2, R 53.1  Onset date: 22        Date of initial visit: 22            Next 's appt.: Dr Natalia Awad 22,     Visit count  ( AQUA C-9) (used  PT C-9)                           Cancel/No Shows: 2/0             Subjective: To see Dr Natalia Awad tomorrow- feels her restrictions will be lifted. Has not used cane for last few days but still uses walker to walk into water. States she worked alone Saturday and did 9 loads of laundry- states she was very sore and tired that evening. Continues with burning at right lateral knee. Infrequently, R LE will feels as though it will buckle but has not given out.     Pain:  [x] Yes  [] No  Location: (R) Knee lateral > medial Pain Rating: (0-10 scale) 2/10  Pain altered Tx:  [x] No  [] Yes  Action:       Objective:    [address] [facility] Exercise Log  Aquatic Knee phase 1    Date of Eval:    8/18/22                            Primary PT: Everardo Alex PT    [x] C-9 dates: Original: C9 2x/week x 9 weeks PHYSICAL THERAPY 8/11/22-9/30/22 (18 vs)- 13 USED     CURRENT C-9 2x/ week for [address] [facility] THERAPY 10/3/22-11/18/22      Date 10/19/22 10/20/22 10/27/22 11/1/22 11/2/22 11/8/22   Visit # 16/21  (3/8) 17/21  (4/8) 18/21  (5/8) 19/21  (6/8) 20/21  (7/8) 21/21  (8/8)   Walk F/L/R 2 Laps 2 Laps 2 Laps 2 Laps 2 Laps 2 Laps   Marching 2 Laps 2 Laps 2 Laps 2 Laps 2 Laps 2 Laps      Low Box Low Box Low Box Low Box   Squats Low Box 10x5\" Low box  10x5\" 10x5\" 10x5\" 15x5\" 15x5\"   Heel toe raises 10x 10x 10x 10x 15x 15x   SLR F/L/R 10x 10x 10x 10x 15x 15x   HS Curl 10x 10x 10x 10x 15x 15x   Step-Ups F/L  Low 10x Tall F 10x Tall 10x+Lat Tall 10x Tall 10x   Step Down F/L         Lunges F/L    Low F 10x Low F 10x Low F 10x   Knee/Flex /Ext 10x 10x 10x 10x 15x 15x            Kickboard Ex. Med Med Med Med   Iso Abd.    10x5\" 10x5\" 10x5\" 10x5\"   Push-pull   10x 15x 15x 15x   Paddling    10x 15x 15x            Balance         Tandem         SLS                  DEEP H2O 1 Noodle 1 Noodle 1 Noodle 1 Noodle 1 Noodle 1 Noodle   Cycling 2' 2' 2' 2' 2' 2'   Jacks 1' 2' 2' 2' 2' -   X-Country 1' 2' 2' 2' 2' -   Hang  3' 2' 2'              Stretches         Achllies 2x20\" 2x20\" 2x20\" 2x20\" 2x20\" 2x20\"   Hamstring 2x20\" 2x20\" 2x20\" 2x20\" 2x20\" 2x20\"   Psoas         Quad                  Cool Down 2 Laps 2 Laps  Breast stroke 2 Laps Breast Stroke 2 Laps Breast Stroke 2 Laps Breast Stroke -   Pain Rating 2 0 0-1 1-2 2 2          Assessment:  [x] Progressing toward goals.   Reviewed current aquatic HEP (previously issued) Patient demonstrates good recall of exercise and offers good technique. Patient denies increased pain throughout session. Limited time this date to complete program due to PT re-assess after aquatics. [] No change. [] Other:                             [x] Patient would continue to benefit from skilled physical therapy services in order to: increased RLE strength to improve stability, ambulate properly and return to PLOF. STG: (to be met in 9 treatments)  ? Pain: to 2/10 at most while walking for patient to return to work and make it through entire shift with minimal pain. ONGOING   ? ROM: of Right knee extension to 0 degrees to normalize stance phase in gait. MET 9/13/22  ? Strength: of R LE to 4+/5 to improve functional strength to prepare for gait without AD. PARTIALLY MET 9/30/22  ? Function: on LEFI to <25% impairment. ONGOING    Independent with Home Exercise Programs- MET  9/13/22  Demonstrate Knowledge of fall prevention- MET  LTG: (to be met in 18 treatments)  Patient to ambulate without AD with normal gait pattern (once 100% WB R LE) without pain or imbalance for her to return to work without restrictions. Patient to ascend/descend flight of stairs (once 100% WB R LE) with reciprocal pattern and no pain for her to resume work duties with ease. Patient goals: to get full use of R leg and get back to work. Pt. Education:  [] Yes  [] No  [x] Reviewed Prior HEP/Ed for DC to Indep HEP  Method of Education: [x] Verbal  [x] Demo  [] Written- discussed progressions of Indep aquatic program- encouraged activity 3-5x/wk and can perform just walking/deep water aerobics 2x/week with full program 3x/week  Comprehension of Education:  [x] Verbalizes understanding. [x] Demonstrates understanding. [] Needs review. [x] Demonstrates/verbalizes HEP/Ed previously given. Plan:    [] Continue per plan of care.               [x] Other: DC to Indep HEP                              Treatment Charges: Mins Units TIME in/Out   []  Modalities         [x]  Ther Exercise 30 2 8711-7578   []  Manual Therapy         []  Ther Activities         [x]  Aquatics  40  3  805 AM- 845 AM   []  Neuromuscular         [] Vasocompression         [] Gait Training         [] Dry needling        [] 1 or 2 muscles        [] 3 or more muscles         []  Other         Total Treatment time 70 4           Aquatics Time In: 231 AM  Time Out: 860 AM  PT Time in: 0900  PT Time out: 0930  Physical therapy treatment completed today in part by physical therapist assistant (PTA). Treatment times reflect total treatment time combined by both PT and PTA for today's service.       Electronically signed by: Freddy Parisi PTA

## 2022-11-08 NOTE — PROGRESS NOTES
[address] [facility]  [facility] [place]. [address]         Phone: [phone]       Fax: [phone]    Discharge Summary/Physical Therapy Progress Note    Date: 2022      Patient: Arcelia Goodman  : 1965  MRN: [id number]    Referring Provider:  Kian Miranda MD/ Luciano Roque MD/     Insurance: [address] [place] [address] : [phone]   Leopold Sheen. Ext [phone]           Claim # [id number]  Original: C9 2x/week x 9 weeks PHYSICAL THERAPY 22-22 (18 vs)- 13 USED     [facility] C[facility]9 [facility]/ [facility]          Medical Diagnosis:  S06.0X1D (ICD-10-CM) - Concussion with loss of consciousness of 30 minutes or less, subsequent encounter   S06.5X0D (ICD-10-CM) - Traumatic subdural hemorrhage without loss of consciousness, subsequent encounter   S06. 6X9D (ICD-10-CM) - Traumatic subarachnoid hemorrhage with loss of consciousness of unspecified duration, subsequent encounter   S82.101D (ICD-10-CM) - Unspecified fracture of upper end of right tibia, subsequent encounter for closed fracture with routine healing   S00.83XD (ICD-10-CM) - Contusion of other part of head, subsequent encounter      Rehab Codes: R26.2, R 53.1  Onset date: 22        Date of initial visit: 22            Next 's appt.: Dr Tim Hernandez 22,      Visit count  ( AQUA C-9) (used  PT C-9)                           Cancel/No Shows: 2/0      Subjective:  Patient arrives without cane and says she hasn't been using it in the last 5 days. Says she still gets some burning in the lateral knee and into the shin after a long day. Says the pain gets to a 4/10 at the worst. She saw the orthopedic last week with good report and she is supposed to return to him only on a as needed basis. He didn't prescribe more therapy at this point.  She is using the pool at her work to exercise and she plans to continue with that after discharge a few times a week along with her HEP. Pain:  [x] Yes  [] No  Location: right knee  Pain Rating: (0-10 scale) 2/10  Pain altered Tx:  [x] No  [] Yes  Action:  Comments:    Objective:  Test Measurements: B LE strength 5/5,   Function: LEFI score of 87.5% function. (4 items omitted due to NA). Stairs- reciprocal pattern no pain. Gait- ambulating with normal gait pattern without AD. Assessment:  Patient has met 2/2 LTGs at this time and 5/6 STGs with pain goal partially met as patient reports her pain can get up to a 4/10 at most after a long day on her feet. Patient with full recovery in LE strength, gait, and stair negotiation. LEFI complete with score of 87.5%  She is appropriate for discharge with continuation of HEP as this time. Also encouraged patient to continue with exercise in pool as she has access to do so at her place of work. STG: (to be met in 9 treatments)  ? Pain: to 2/10 at most while walking for patient to return to work and make it through entire shift with minimal pain. PARTIALLY MET 11/8/22  ? ROM: of Right knee extension to 0 degrees to normalize stance phase in gait. MET 9/13/22  ? Strength: of R LE to 4+/5 to improve functional strength to prepare for gait without AD. MET 11/8/22  ? Function: on LEFI to <25% impairment. MET 11/8/22  Independent with Home Exercise Programs- MET  9/13/22  Demonstrate Knowledge of fall prevention- MET  LTG: (to be met in 18 treatments)  Patient to ambulate without AD with normal gait pattern (once 100% WB R LE) without pain or imbalance for her to return to work without restrictions. MET 11/8/22  Patient to ascend/descend flight of stairs (once 100% WB R LE) with reciprocal pattern and no pain for her to resume work duties with ease. MET 11/8/22      Patient goals: to get full use of R leg and get back to work.    Treatment to Date:  [x] Therapeutic Exercise   65120  [] Iontophoresis: 4 mg/mL Dexamethasone Sodium Phosphate  mAmin  20617   [] Therapeutic Activity  02381 [] Vasopneumatic cold with compression  [id number]    [x] Gait Training   [id number] [] Ultrasound   [id number]   [] Neuromuscular Re-education  [id number] [] Electrical Stimulation Unattended  16427   [] Manual Therapy  20017 [] Electrical Stimulation Attended  [id number]   [x] Instruction in HEP  [] Lumbar/Cervical Traction  [id number]   [x] Aquatic Therapy   [id number] [] Cold/hotpack    [] Massage   01034      [] Dry Needling, 1 or 2 muscles  96744   [] Biofeedback, first 15 minutes   81849  [] Biofeedback, additional 15 minutes   15422 [] Dry Needling, 3 or more muscles  20096      Patient Status:     [] Continue per initial plan of care. [] Additional visits necessary. [x] Other: Discharge from PT services at this time. Electronically signed by: Jerry Forte PT    If you have any questions or concerns, please don't hesitate to call. Thank you for your referral.    Physician Signature:________________________________Date:__________________  By signing above or cosigning this note, I have reviewed this plan of care and certify a need for medically necessary rehabilitation services.      *PLEASE SIGN ABOVE AND FAX BACK ALL PAGES*

## 2022-11-09 ENCOUNTER — HOSPITAL ENCOUNTER (OUTPATIENT)
Dept: MRI IMAGING | Facility: CLINIC | Age: 57
Discharge: HOME | End: 2022-11-11
Payer: COMMERCIAL

## 2022-11-09 DIAGNOSIS — S06.5XAA SDH (SUBDURAL HEMATOMA): ICD-10-CM

## 2022-11-09 PROCEDURE — 6360000004 HC RX CONTRAST MEDICATION: Performed by: NURSE PRACTITIONER

## 2022-11-09 PROCEDURE — A9579 GAD-BASE MR CONTRAST NOS,1ML: HCPCS | Performed by: NURSE PRACTITIONER

## 2022-11-09 PROCEDURE — 70553 MRI BRAIN STEM W/O & W/DYE: CPT

## 2022-11-09 PROCEDURE — 2580000003 HC RX 258: Performed by: NURSE PRACTITIONER

## 2022-11-09 RX ORDER — SODIUM CHLORIDE 0.9 % (FLUSH) 0.9 %
10 SYRINGE (ML) INJECTION PRN
Status: DISCONTINUED | OUTPATIENT
Start: 2022-11-09 | End: 2022-11-12 | Stop reason: HOSPADM

## 2022-11-09 RX ADMIN — GADOTERIDOL 15 ML: 279.3 INJECTION, SOLUTION INTRAVENOUS at 14:07

## 2022-11-09 RX ADMIN — SODIUM CHLORIDE, PRESERVATIVE FREE 10 ML: 5 INJECTION INTRAVENOUS at 14:10

## 2022-11-16 ENCOUNTER — TELEPHONE (OUTPATIENT)
Dept: NEUROLOGY | Age: 57
End: 2022-11-16

## 2022-11-16 NOTE — TELEPHONE ENCOUNTER
Spoke with Matty Marshall this morning regarding her neuropsychological evaluation later today. She is asking if this could be faxed over to Dr. Isaiah Anna, Ph.D.  This is at [facility] ([phone]. Call placed to the facility to confirm fax number of [phone]. Several attempts were made at the given fax number unsuccessfully. Call placed back to facility and she gave an alternative number [phone]. Fax successfully sent.

## 2023-01-17 ENCOUNTER — OFFICE VISIT (OUTPATIENT)
Dept: NEUROLOGY | Age: 58
End: 2023-01-17
Payer: COMMERCIAL

## 2023-01-17 VITALS
HEART RATE: 79 BPM | BODY MASS INDEX: 24.17 KG/M2 | SYSTOLIC BLOOD PRESSURE: 138 MMHG | DIASTOLIC BLOOD PRESSURE: 84 MMHG | HEIGHT: 64 IN | WEIGHT: 141.6 LBS

## 2023-01-17 DIAGNOSIS — R41.3 MEMORY DIFFICULTIES: Primary | ICD-10-CM

## 2023-01-17 DIAGNOSIS — Z86.79 HISTORY OF SUBDURAL HEMATOMA: ICD-10-CM

## 2023-01-17 PROCEDURE — 99213 OFFICE O/P EST LOW 20 MIN: CPT | Performed by: NURSE PRACTITIONER
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
## 2023-02-03 ENCOUNTER — TELEPHONE (OUTPATIENT)
Dept: NEUROLOGY | Age: 58
End: 2023-02-03

## 2023-02-03 NOTE — TELEPHONE ENCOUNTER
No I do not think it has anything to do with her head injury.   We can order additional testing if needed, but I would advise that she drink at least 60 ounces of water a day and buy some compression stockings at the drugstore or at [facility] and wear them when she is up and about walking

## 2023-02-03 NOTE — TELEPHONE ENCOUNTER
Germán Cano called in stated while she was at work she fainted. She stated that her hands were extremely cold before she went into the building. She had to wipe her windows off in the cold and her car takes a while to warm up. She was transported to the ER ([facility]) by EMS. On her way to the ER her BP was 99/68 it returned to normal. Ems told her that she was dehydrated. She had labs and a chest Xray done that came back normal.  She stated that she would like to know if her brain injury could've caused her to faint due to the coldness. Germán Jerome said that she does not have a PCP.  Can you Please advise

## 2023-02-03 NOTE — TELEPHONE ENCOUNTER
I called Germán Cano with this message. She voiced understanding. She is drinking electrolyte solution also.

## 2023-05-09 ENCOUNTER — HOSPITAL ENCOUNTER (OUTPATIENT)
Dept: PHYSICAL THERAPY | Age: 58
Setting detail: THERAPIES SERIES
Discharge: HOME | End: 2023-05-09
Payer: COMMERCIAL

## 2023-05-09 PROCEDURE — 97162 PT EVAL MOD COMPLEX 30 MIN: CPT

## 2023-05-09 PROCEDURE — 97110 THERAPEUTIC EXERCISES: CPT
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
## 2023-05-12 ENCOUNTER — HOSPITAL ENCOUNTER (OUTPATIENT)
Dept: PHYSICAL THERAPY | Age: 58
Setting detail: THERAPIES SERIES
Discharge: HOME | End: 2023-05-12
Payer: COMMERCIAL

## 2023-05-12 PROCEDURE — 97140 MANUAL THERAPY 1/> REGIONS: CPT

## 2023-05-12 PROCEDURE — 97110 THERAPEUTIC EXERCISES: CPT

## 2023-05-12 NOTE — FLOWSHEET NOTE
[x] Demo  [] Written  Comprehension of Education:  [x] Verbalizes understanding. [x] Demonstrates understanding. [] Needs review. [x] Demonstrates/verbalizes HEP/Ed previously given. Plan: [x] Continue per plan of care.    [] Other:      Treatment Charges: Mins Units Time in/out    []  Modalities      [x]  Ther Exercise 30 2 9604-7195/ [address]   [x]  Manual Therapy 10 1 3040-0802   []  Ther Activities      []  Aquatics      []  Neuromuscular      [] Vasocompression      [] Gait Training      [] Dry needling        [] 1 or 2 muscles        [] 3 or more muscles      []  Other      Total Treatment time 40 3      Time In:0900            Time Out: 0940    Electronically signed by:  Anisha Stern PT

## 2023-05-16 ENCOUNTER — HOSPITAL ENCOUNTER (OUTPATIENT)
Dept: PHYSICAL THERAPY | Age: 58
Setting detail: THERAPIES SERIES
Discharge: HOME | End: 2023-05-16
Payer: COMMERCIAL

## 2023-05-16 PROCEDURE — 97110 THERAPEUTIC EXERCISES: CPT

## 2023-05-16 PROCEDURE — 97140 MANUAL THERAPY 1/> REGIONS: CPT

## 2023-05-16 NOTE — FLOWSHEET NOTE
[x] [facility]) - [facility]  [address]  [place]: [phone]   F: [phone]    Physical Therapy Daily Treatment Note      Date:  2023  Patient Name:  Luis Leos    :  1965  MRN: [id number]  Referring Provider: Rock Natan MD                               Insurance: [employer] for vestibular therapy from -23   Medical Diagnosis:S06.5X0A (ICD-10-CM) - Traumatic subdural hemorrhage without loss of consciousness, initial encounter  Rehab Codes: R42, M54.2, R51   Onset date: 22                 Next 's appt.: 23, neurologist 23   Visit# / total visits: 3/6  Cancels/No Shows: 0/0    Subjective:  Continues with pain when looking up and back. 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  States at times she has to sit when rising from bed but admits she gets up quickly    Pain:  [x] Yes  [] No Location:  B shoulder blades; posterior neck; R middle and upper trap > left Pain Rating: (0-10 scale) 3/10  Pain altered Tx:  [x] No  [] Yes  Action:    Comments:    Objective:  Precautions: history right tibia fx   Exercises:INSTRUCTION IN HOME EXERCISE PROGRAM  Exercise Reps/ Time Weight/ Level Comments Completed 23     SCM stretch (left) 3x30\"    Review proper technique - HEP X    cervical retraction supine  2x10 5\"    Reviewed HEP  x   Row/Ext   2x10 5\"  Green Watch R UT compensation x   Shoulder depression  2x10 5\" hold    x   Wall surrenders  2x10   x   Standing cervical retraction with shoulder scaption 2x10   x   Total gym prone (B) Y, T, ext, row 10x each  Watch for R UT compensation X   Nose to Knee Alternating R and L 5x each  Symptoms of lightheadedness only with coming up vs going down X Issued HEP 23    MANUAL:  * Supine Sub occipital release  *Trigger point release to left SCM and 1st rib along with Right upper trap  *Upper cervical rotation stretch  *Left cervical

## 2023-05-18 ENCOUNTER — HOSPITAL ENCOUNTER (OUTPATIENT)
Dept: PHYSICAL THERAPY | Age: 58
Setting detail: THERAPIES SERIES
Discharge: HOME | End: 2023-05-18
Payer: COMMERCIAL

## 2023-05-18 ENCOUNTER — APPOINTMENT (OUTPATIENT)
Dept: PHYSICAL THERAPY | Age: 58
End: 2023-05-18
Payer: COMMERCIAL

## 2023-05-18 PROCEDURE — 97140 MANUAL THERAPY 1/> REGIONS: CPT

## 2023-05-18 PROCEDURE — 97110 THERAPEUTIC EXERCISES: CPT

## 2023-05-18 NOTE — FLOWSHEET NOTE
[x] [facility]) - [facility] Therapy  [address]  [place]: [phone]   F: [phone]    Physical Therapy Daily Treatment Note      Date:  2023  Patient Name:  Brisa Mooney    :  1965  MRN: [id number]  Referring Provider: Pablo Urias MD                               Insurance: [employer] for vestibular therapy from -23   Medical Diagnosis:S06.5X0A (ICD-10-CM) - Traumatic subdural hemorrhage without loss of consciousness, initial encounter  Rehab Codes: R42, M54.2, R51   Onset date: 22                 Next 's appt.: 23, neurologist 23   Visit# / total visits: 3/6  Cancels/No Shows: 0/0    Subjective:  Patient reporting to therapy from work stating she is very sore.       Pain:  [x] Yes  [] No Location:  B shoulder blades; posterior neck; R middle and upper trap > left Pain Rating: (0-10 scale) 3-4/10; B knees 4-5/10  Pain altered Tx:  [x] No  [] Yes  Action:    Comments:    Objective:  Precautions: history right tibia fx   Exercises:INSTRUCTION IN HOME EXERCISE PROGRAM  Exercise Reps/ Time Weight/ Level Comments Completed 23     SCM stretch (left) 3x30\"    Review proper technique - HEP    cervical retraction supine  2x10 5\"    Reviewed HEP  performed in standing  X   Row/Ext   2x10 5\"  Green Watch R UT compensation X   Shoulder depression  2x10 5\" hold    X   Wall surrenders  2x10   X   Standing cervical retraction with shoulder scaption 2x10   X   Mika ER with t-band 2x10  red  added X   B horizontal ABD 2x10 red  added X                 Total gym prone (B) Y, T, ext, row: scap depression 10x each  Watch for R UT compensation X   Nose to Knee Alternating R and L 5x each  Symptoms of lightheadedness only with coming up vs going down     MANUAL:  * Supine Sub occipital release  *Trigger point release to left SCM and 1st rib along with Right upper trap  *Upper cervical rotation stretch  *Left cervical lateral flexion

## 2023-05-22 ENCOUNTER — HOSPITAL ENCOUNTER (OUTPATIENT)
Dept: PHYSICAL THERAPY | Age: 58
Setting detail: THERAPIES SERIES
Discharge: HOME | End: 2023-05-22
Payer: COMMERCIAL

## 2023-05-22 PROCEDURE — 97110 THERAPEUTIC EXERCISES: CPT

## 2023-05-22 PROCEDURE — 97140 MANUAL THERAPY 1/> REGIONS: CPT

## 2023-05-22 NOTE — FLOWSHEET NOTE
[x] [facility] - [facility] Therapy  [address]  [place]: [phone]   F: [phone]    Physical Therapy Daily Treatment Note      Date:  2023  Patient Name:  Mary Tyson    :  1965  MRN: [id number]  Referring Provider: Ed Faye MD                               Insurance: [employer] for vestibular therapy from -23   Medical Diagnosis:S06.5X0A (ICD-10-CM) - Traumatic subdural hemorrhage without loss of consciousness, initial encounter  Rehab Codes: R42, M54.2, R51   Onset date: 22                 Next 's appt.: 23, neurologist 23   Visit# / total visits: 5/ (corrected count)  Cancels/No Shows: 0/0    Subjective: Had episode this weekend while sleeping in bed- rolled over to the left and experienced herself spinning - didn't last long. Saw  this AM- states Dr is to order 6 more therapy sessions. Awoke with headache but it has since subsided. To see neurologist end of the week.  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     Pain:  [x] Yes  [] No Location:  (B) Posterior cervical spine into shoulder blades and B Upper trap; tenderness along suboccipital ridge Pain Rating: (0-10 scale) 1/10  Pain altered Tx:  [x] No  [] Yes  Action:    Comments:    Objective:  Precautions: history right tibia fx   Exercises:INSTRUCTION IN HOME EXERCISE PROGRAM  Exercise Reps/ Time Weight/ Level Comments Completed 23                   Row/Ext   2x10 5\"  Green Watch R UT compensation X   Retro Shoulder shrugs with scapular squeeze 15X 3#  X   Wall surrenders  2x10   X   Standing cervical retraction with shoulder scaption 2x10   x   Scapular squeeze with (B) ER @ neutral- T-Band 10x5\" hold red  X   B horizontal ABD T-Bad 10x2 red  X                 Prone on plinth (B) Y, T, ext, row 10x each   X   Nose to Knee Alternating R and L 5x each  Symptoms of lightheadedness only with coming up vs going down Verbally reviewed and demonstrated-

## 2023-05-25 ENCOUNTER — HOSPITAL ENCOUNTER (OUTPATIENT)
Dept: PHYSICAL THERAPY | Age: 58
Setting detail: THERAPIES SERIES
Discharge: HOME | End: 2023-05-25
Payer: COMMERCIAL

## 2023-05-25 ENCOUNTER — OFFICE VISIT (OUTPATIENT)
Dept: NEUROLOGY | Age: 58
End: 2023-05-25

## 2023-05-25 VITALS
HEIGHT: 64 IN | BODY MASS INDEX: 23.39 KG/M2 | DIASTOLIC BLOOD PRESSURE: 86 MMHG | WEIGHT: 137 LBS | HEART RATE: 76 BPM | SYSTOLIC BLOOD PRESSURE: 127 MMHG

## 2023-05-25 DIAGNOSIS — Z86.79 HISTORY OF SUBDURAL HEMATOMA: ICD-10-CM

## 2023-05-25 DIAGNOSIS — G44.309 HEADACHES DUE TO OLD HEAD INJURY: ICD-10-CM

## 2023-05-25 DIAGNOSIS — R41.3 MEMORY DIFFICULTIES: Primary | ICD-10-CM

## 2023-05-25 DIAGNOSIS — S09.90XS HEADACHES DUE TO OLD HEAD INJURY: ICD-10-CM

## 2023-05-25 RX ORDER — ASPIRIN 81 MG/1
81 TABLET ORAL DAILY
COMMUNITY

## 2023-05-25 RX ORDER — LANOLIN ALCOHOL/MO/W.PET/CERES
400 CREAM (GRAM) TOPICAL DAILY
Qty: 30 TABLET | Refills: 2 | Status: SHIPPED | OUTPATIENT
Start: 2023-05-25

## 2023-05-25 NOTE — FLOWSHEET NOTE
[x] [facility]) - [facility] & Therapy  [address]  [place]: [phone]   F: [phone]     Physical Therapy Cancel/No Show note    Date: 2023  Patient: Yenni Ivey  : 1965  MRN: [id number]    Visit Count: 5/6  Cancels/No Shows to date:     For today's appointment patient:    [x]  Cancelled    [] Rescheduled appointment    [] No-show     Reason given by patient:    []  Patient ill    []  Conflicting appointment    [] No transportation      [] Conflict with work    [] No reason given    [] Weather related    [] COVID-19    [x] Other:   Son is in the hospital, going to see him.     Comments:        [x] Next appointment was confirmed    Electronically signed by: Raman Loya, PT

## 2023-05-25 NOTE — PROGRESS NOTES
ALLERGIES:     Allergies   Allergen Reactions    Codeine Hives                                 REVIEW OF SYSTEMS        All items selected indicate a positive finding. Those items not selected are negative.   Constitutional [] Weight loss/gain   [] Fatigue  [] Fever/Chills   HEENT [] Hearing Loss  [] Visual Disturbance  [] Tinnitus  [] Eye pain   Respiratory [] Shortness of Breath  [] Cough  [] Snoring   Cardiovascular [] Chest Pain  [] Palpitations  [] Lightheaded   GI [] Constipation  [] Diarrhea  [] Swallowing change  [] Nausea/vomiting    [] Urinary Frequency  [] Urinary Urgency   Musculoskeletal [] Neck pain  [] Back pain  [] Muscle pain  [] Restless legs   Dermatologic [] Skin changes   Neurologic [x] Memory loss/confusion  [] Seizures  [] Trouble walking or imbalance  [] Dizziness  [] Sleep disturbance  [] Weakness  [] Numbness  [] Tremors  [] Speech Difficulty  [x] Headaches  [] Light Sensitivity  [] Sound Sensitivity   Endocrinology []Excessive thirst  []Excessive hunger   Psychiatric [] Anxiety/Depression  [] Hallucination   Allergy/immunology []Hives/environmental allergies   Hematologic/lymph [] Abnormal bleeding  [] Abnormal bruising         PHYSICAL EXAMINATION:       Vitals:    05/25/23 0840   BP: 132/80   Pulse: 67                                              .                                                                                                    General Appearance:  Alert, cooperative, no signs of distress, appears stated age   Head:  Normocephalic, no signs of trauma   Eyes:  Conjunctiva/corneas clear;  eyelids intact   Ears:  Normal external ear and canals   Nose: Nares normal, mucosa normal, no drainage    Throat: Lips and tongue normal; teeth normal;  gums normal   Neck: Supple, intact flexion, extension and rotation;   trachea midline;  no adenopathy;   thyroid: not enlarged;   no carotid pulse abnormality   Back:   Symmetric, no curvature, ROM adequate   Lungs:

## 2023-05-30 ENCOUNTER — HOSPITAL ENCOUNTER (OUTPATIENT)
Dept: PHYSICAL THERAPY | Age: 58
Setting detail: THERAPIES SERIES
Discharge: HOME | End: 2023-05-30
Payer: COMMERCIAL

## 2023-05-30 PROCEDURE — 97140 MANUAL THERAPY 1/> REGIONS: CPT

## 2023-05-30 PROCEDURE — 97110 THERAPEUTIC EXERCISES: CPT

## 2023-05-30 NOTE — FLOWSHEET NOTE
[x] [facility] ([facility]) - [facility] & Therapy  [address]  [place] Zamor[place]: [phone]   F: [phone]    Progress Note/Physical Therapy Daily Treatment Note      Date:  2023  Patient Name:  Wendy Kaye    :  1965  MRN: [id number]  Referring Provider: Tatyana Torres MD                               Insurance: [facility] for vestibular therapy from -23   Medical Diagnosis:S06.5X0A (ICD-10-CM) - Traumatic subdural hemorrhage without loss of consciousness, initial encounter  Rehab Codes: R42, M54.2, R51   Onset date: 22                 Next 's appt.: 23, neurologist 23   Visit# / total visits:    Cancels/No Shows: 1/0    Subjective:  Reports she is doing ok. She had to cancel last appt due to her son being in the hospital. Had  a couple dizzy spells this weekend while working. Reports she saw neurologist last week who agrees her symptoms are vestibular. Reports less dizziness since starting PT but her neck is \"killing her\". Reports her neck pain feels worse after stretches. Is only getting symptoms at work now.      Pain:  [x] Yes  [] No Location:  (B) Posterior cervical spine into shoulder blades and B Upper trap; tenderness along suboccipital ridge Pain Rating: (0-10 scale) 0/10  Pain altered Tx:  [x] No  [] Yes  Action:    Comments:    Objective:  Precautions: history right tibia fx   Exercises:INSTRUCTION IN HOME EXERCISE PROGRAM  Exercise Reps/ Time Weight/ Level Comments Completed 23                   Row/Ext   2x10 5\"  Green Watch R UT compensation    Retro Shoulder shrugs with scapular squeeze 15X 3#     Wall surrenders  2x10      Standing cervical retraction with shoulder scaption 2x10      Scapular squeeze with (B) ER @ neutral- T-Band 10x5\" hold red     B horizontal ABD T-Bad 10x2 red     Cervical extension stretch 3x30\"  Added to HEP  X   Upper cerivcal/capital stretch 3x30\"   X    Prone on plinth (B) Y, T, ext, row 10x each      Prone

## 2023-06-06 ENCOUNTER — HOSPITAL ENCOUNTER (OUTPATIENT)
Dept: PHYSICAL THERAPY | Age: 58
Setting detail: THERAPIES SERIES
Discharge: HOME | End: 2023-06-06
Payer: COMMERCIAL

## 2023-06-06 PROCEDURE — 97112 NEUROMUSCULAR REEDUCATION: CPT

## 2023-06-06 NOTE — FLOWSHEET NOTE
2.  Patient goals: check inner ear, stop dizzy spells, maintain work status. Pt. Education:  [x] Yes  [] No  [x] Reviewed Prior HEP/Ed-   Method of Education: [x] Verbal  [x] Demo  [] Written  Comprehension of Education:  [x] Verbalizes understanding. [x] Demonstrates understanding. [] Needs review. [x] Demonstrates/verbalizes HEP/Ed previously given. Plan: [x] Continue per plan of care.    [] Other:      Treatment Charges: Mins Units Time in/out    []  Modalities      [x]  Ther Exercise      [x]  Manual Therapy      []  Ther Activities      []  Aquatics      [x]  Neuromuscular 40 3 1045-1125am   [] Vasocompression      [] Gait Training      [] Dry needling        [] 1 or 2 muscles        [] 3 or more muscles      []  Other      Total Treatment time 40 3 7800-7597     Time In: 7010 AM         Time Out: 1130AM    Electronically signed by:  Rashard Stapleton PT

## 2023-06-08 ENCOUNTER — HOSPITAL ENCOUNTER (OUTPATIENT)
Dept: PHYSICAL THERAPY | Age: 58
Setting detail: THERAPIES SERIES
Discharge: HOME | End: 2023-06-08
Payer: COMMERCIAL

## 2023-06-08 PROCEDURE — 97112 NEUROMUSCULAR REEDUCATION: CPT

## 2023-06-08 PROCEDURE — 97110 THERAPEUTIC EXERCISES: CPT

## 2023-06-08 NOTE — FLOWSHEET NOTE
when returning to standing  2x10  Some lightheadedness reported  X    Cone Floor to  overhead Diagonally to PT  with tracking 8x each- up/down   Mild symptoms of lightheadedness  x   Cone floor to overhead trying to find therapist hand to right or left with cervical extension 10x  Mild symptoms of lightheadedness and \"eyes feel funny\"     MANUAL:  * Supine Sub occipital release  *Trigger point release to L SCM        10'        Manual:  MFR at B UT 10'  Performed in seated this date. GST far target HM H/V 1' each   No vestibular  symptoms, horizontal patient says she felt nervous      OTHER:        Specific Instructions for next treatment: Progress with scapular strengthening and habituation activity, manual PRN to decrease muscle tightness and pain. Assessment: [x] Progressing toward goals. Initiated session with cervical stretching to decrease pain. Continued with habituation exercises to decrease motion sensitivity. Added Head/eye coordination H/V to challenge VOR and decrease dizziness with head movements. Challenged vestibular system with squats with head movements. Patient would continue to benefit from skilled physical therapy services in order to address the above limitations to improve functional mobility and abolish dizziness for patient to complete ADLs/IADLs with least amount of compensation or restriction. [] No change. [] Other:     Short Term Goals: ( 12 Treatments)  [x] 1. Patient will report 7 days with no lightheadedness   [x] 2. Patient will report 75% improvement in dizziness. [x] 3. Patient to report no neck pain. [x] 4. Patient to report no more than 1 headache a week. [x] 5. Improve scapular strength (left) to 4+/5 to decrease neck tightness/pain and dizzinesss. [x] 6. Decreased Dizziness Handicap Inventory (DHI) (<18 /100)  [x] 7. Independent with Home Exercise Program (HEP). MET      Long Term Goals: (TBD Treatments)  [] 1.  [] 2.   Patient goals: check inner ear, stop

## 2023-06-15 ENCOUNTER — APPOINTMENT (OUTPATIENT)
Dept: PHYSICAL THERAPY | Age: 58
End: 2023-06-15
Payer: COMMERCIAL

## 2023-06-19 ENCOUNTER — HOSPITAL ENCOUNTER (OUTPATIENT)
Dept: PHYSICAL THERAPY | Age: 58
Setting detail: THERAPIES SERIES
Discharge: HOME | End: 2023-06-19
Payer: COMMERCIAL

## 2023-06-19 PROCEDURE — 97112 NEUROMUSCULAR REEDUCATION: CPT

## 2023-06-19 PROCEDURE — 97110 THERAPEUTIC EXERCISES: CPT

## 2023-06-19 PROCEDURE — 97140 MANUAL THERAPY 1/> REGIONS: CPT

## 2023-06-19 NOTE — FLOWSHEET NOTE
[x] [facility] ([facility]) - [facility] & Therapy  [address]  [place]: [phone]   F: [phone]    Physical Therapy Daily Treatment Note      Date:  2023  Patient Name:  Devon Marmolejo    :  1965  MRN: [id number]  Referring Provider: Homer Rockwell MD                               Insurance: [employer] 6 more visits for vestibular therapy from 23 - 23   Medical Diagnosis:S06.5X0A (ICD-10-CM) - Traumatic subdural hemorrhage without loss of consciousness, initial encounter  Rehab Codes: I64, M54.2, R51   Onset date: 22                 Next 's appt.: 23  Visit# / total visits: 10/12 ( on new C9)   Cancels/No Shows:     Subjective:  : Patient arrived and reports her symptoms remain the same states she has intermittent lightheadedness but no episodes of fainting or falling. Patient reports the last time she was with MD they told her she was dehydrated so she has been trying to increase water intake.      Pain:  [x] Yes  [] No Location: posterior neck, headache Pain Rating: (0-10 scale) 2-3/10  Pain altered Tx:  [x] No  [] Yes  Action:    Comments:    Objective:  Precautions: history right tibia fx   Exercises:INSTRUCTION IN HOME EXERCISE PROGRAM  Exercise Reps/ Time Weight/ Level Comments Completed 23     Habituation: Marybeth Locks left and right  5x each way   No symptoms  x   Habituation elbow to mat, cervical lateral flexion (work simulated task)  X 5 each way   X    Prone on total gym: Y, T, prone cervical retraction 2x10 each   X    Seated Head/eye coordination  H/V  2' each       Rows   2x15 5\"  Green  x   Retro Shoulder shrugs with scapular squeeze 15X 3#     Wall surrenders  2x10      Standing cervical retraction with shoulder scaption 2x10      Scapular squeeze with (B) ER @ neutral- T-Band 10x5\" hold red     B horizontal ABD T-Bad 10x2 red     Cervical extension stretch 3x30\"  Added to HEP  x   Upper cerivcal/capital stretch 3x30\"   x

## 2023-06-21 ENCOUNTER — APPOINTMENT (OUTPATIENT)
Dept: PHYSICAL THERAPY | Age: 58
End: 2023-06-21
Payer: COMMERCIAL

## 2023-06-22 ENCOUNTER — HOSPITAL ENCOUNTER (OUTPATIENT)
Dept: PHYSICAL THERAPY | Age: 58
Setting detail: THERAPIES SERIES
Discharge: HOME | End: 2023-06-22
Payer: COMMERCIAL

## 2023-06-22 PROCEDURE — 97112 NEUROMUSCULAR REEDUCATION: CPT

## 2023-06-22 NOTE — PROGRESS NOTES
[x] [facility]) - [facility] & Therapy  [address]  [place]: [phone]   F: [phone]    Progress note/Treatment Note       Date:  2023  Patient Name:  Kenneth Sims    :  1965  MRN: [id number]  Referring Provider: Renita Lloyd MD                               Insurance: [employer] 6 more visits for vestibular therapy from 23 - 23   Medical Diagnosis:S06.5X0A (ICD-10-CM) - Traumatic subdural hemorrhage without loss of consciousness, initial encounter  Rehab Codes: Z90, M54.2, R51   Onset date: 22                 Next 's appt.: 23  Visit# / total visits: ( on new C9)   Cancels/No Shows: 2/0  Reporting Period: 23-23         Subjective:   Reports that she has been wearing compression stockings that neurologist ordered. She is also taking Magnesium oxide. She has been taking that for almost a month now. Says her work load is not as heavy so that is helping her too. Reports that the lightheadedness is less and not happening everyday. Reports the headaches are still occurring but usually come and go quickly. Reports her neck pain is still there but improving. Reports overall her dizziness has improved by 50%. Pain:  [x] Yes  [] No Location: posterior neck, headache Pain Rating: (0-10 scale) 2-3/10  Pain altered Tx:  [x] No  [] Yes  Action:    Comments:    Objective:  Scapular strengthenin+/5 Bilaterally   DHI score- 18% impairment (28% on evaluation).          Precautions: history right tibia fx   Exercises:INSTRUCTION IN HOME EXERCISE PROGRAM  Exercise Reps/ Time Weight/ Level Comments Completed 23     Habituation: Conor Pride left and right  5x each way   No symptoms  x   Habituation elbow to mat, cervical lateral flexion (work simulated task)  X 5 each way  Symptomatic coming up  X    Cone Floor to  overhead Diagonally to PT  with tracking, cone pick ups/put back down  8x each- up/down  ON FOAM Mild symptoms of

## 2023-06-28 ENCOUNTER — HOSPITAL ENCOUNTER (OUTPATIENT)
Dept: PHYSICAL THERAPY | Age: 58
Setting detail: THERAPIES SERIES
Discharge: HOME | End: 2023-06-28
Payer: COMMERCIAL

## 2023-06-28 PROCEDURE — 97140 MANUAL THERAPY 1/> REGIONS: CPT

## 2023-06-28 PROCEDURE — 97112 NEUROMUSCULAR REEDUCATION: CPT

## 2023-06-28 PROCEDURE — 97110 THERAPEUTIC EXERCISES: CPT

## 2023-07-13 ENCOUNTER — HOSPITAL ENCOUNTER (OUTPATIENT)
Dept: PHYSICAL THERAPY | Age: 58
Setting detail: THERAPIES SERIES
Discharge: HOME | End: 2023-07-13

## 2023-07-13 NOTE — FLOWSHEET NOTE
[x] [facility]) [facility] & Therapy  [address]  [place]: [phone]   F: [phone]     Physical Therapy Cancel/No Show note    Date: 2023  Patient: Torin Wayne  : 1965  MRN: [id number]    Visit Count:   Cancels/No Shows to date: 3/0    For today's appointment patient:    [x]  Cancelled    [] Rescheduled appointment    [] No-show     Reason given by patient:    []  Patient ill    []  Conflicting appointment    [] No transportation      [] Conflict with work    [] No reason given    [] Weather related    [] [id number]    [x] Other:   cant make it    Comments:        [x] Next appointment was confirmed    Electronically signed by: Neftaly Núñez, PT

## 2023-07-18 ENCOUNTER — HOSPITAL ENCOUNTER (OUTPATIENT)
Dept: PHYSICAL THERAPY | Age: 58
Setting detail: THERAPIES SERIES
Discharge: HOME | End: 2023-07-18
Payer: COMMERCIAL

## 2023-07-18 PROCEDURE — 97112 NEUROMUSCULAR REEDUCATION: CPT

## 2023-07-18 PROCEDURE — 97110 THERAPEUTIC EXERCISES: CPT

## 2023-07-18 NOTE — FLOWSHEET NOTE
Scapular squeeze with (B) ER @ neutral- T-Band 10x5\" hold red     B horizontal ABD T-Bad 10x2 red     Cervical extension stretch 3x30\"  Added to HEP     Upper cerivcal/capital stretch 3x30\"      Prone on plinth (B) Y, T, ext, row 10x each      Prone cervical retraction 2x10      Nose to Knee right and left  5x each      Standing head nods eyes open/EC 1'  each       Squats with vertical HM down in squat, up when returning to standing  2x10  Some lightheadedness reported  x   Cone Floor to  overhead Diagonally to PT  with tracking, cone pick ups/put back down  8x each- up/down  FOAM Mild symptoms of lightheadedness/ imbalance on first rest to the left  x   Cone floor to overhead trying to find therapist hand to right or left with cervical extension 8x  Mild symptoms of lightheadedness 6/28 x   Ball/body circles 1' ea direction 4# Mild dizziness towards end of second minute x   Squatting down to place cone under mat and back up  X 6 cones x 3 reps up/dwon   X    Standing lateral trunk flexion 5x ea  No dizziness 6/28           Standing lateral trunk flexion + 4# ball press out from chess 5x ea  Mild dizziness 6/28     MANUAL:  * Supine Sub occipital release (bilateral)   *Trigger point release to L cervical paraspinals        10'       Manual:  MFR at B UT 10'  Performed in seated this date. GST far target HM H/V 1' each   No vestibular  symptoms, horizontal patient says she felt nervous      Specific Instructions for next treatment: continue with cervical stretching, scapular strengthening and habituation activity, manual PRN to decrease muscle tightness and pain. Assessment: [x] Progressing toward goals. Patient with minimal symptoms with habituation exercises this date. Continued with cervical stabilization and scapular strengthening and added challenge with squatting down cone down/up at mat table. VOR cancellation exercises continues to increase symptoms but they resolve quickly.  Patient will benefit

## 2023-07-20 ENCOUNTER — HOSPITAL ENCOUNTER (OUTPATIENT)
Dept: PHYSICAL THERAPY | Age: 58
Setting detail: THERAPIES SERIES
Discharge: HOME | End: 2023-07-20
Payer: COMMERCIAL

## 2023-07-20 PROCEDURE — 97110 THERAPEUTIC EXERCISES: CPT

## 2023-07-20 PROCEDURE — 97112 NEUROMUSCULAR REEDUCATION: CPT

## 2023-07-20 NOTE — FLOWSHEET NOTE
[x] [facility]) - [facility] & Therapy  [address]  [place]: [phone]   F: [phone]    Physical Therapy Daily Treatment Note      Date:  2023  Patient Name:  Anny Tomlinson    :  1965  MRN: [id number]  Referring Provider: Shivani Haney MD                               Insurance: [address] 6 more visits for vestibular therapy from 23 - 23  Medical Diagnosis:S06.5X0A (ICD-10-CM) - Traumatic subdural hemorrhage without loss of consciousness, initial encounter  Rehab Codes: U39, M54.2, R51   Onset date: 22                 Next 's appt.: 23   Visit# / total visits:  (2/ on new C9)   Cancels/No Shows:3/0    Subjective: Patient reporting to therapy stating she continues to have episodes of dizziness but aren't as bad as before and can recover from them faster.   Pain:  [x] Yes  [] No Location: posterior neck, headache Pain Rating: (0-10 scale) 2/10  Pain altered Tx:  [x] No  [] Yes  Action:    Comments:    Objective:  Precautions: history right tibia fx   Exercises:INSTRUCTION IN HOME EXERCISE PROGRAM  Exercise Reps/ Time Weight/ Level Comments Completed 23     Habituation: Joy Delgado left and right  5x each way   No symptoms     Habituation elbow to mat, cervical lateral flexion (work simulated task)  2X 5 each way   headache noted at the end of exercise x   Habituation: Rolling sidelying to sidelying 5x to the right, 2x5 to the left   Initial dizziness on first rep to the left improved with repetition x   Prone on total gym: Y, T, prone cervical retraction 2x10 each      Seated Head/eye coordination  H/V  2' each       Rows   2x15 5\"  Green  emphasis on chin tuck x   Retro Shoulder shrugs with scapular squeeze 15X 3#     Wall surrenders  2x10      Standing cervical retraction with shoulder scaption 2x10      Scapular squeeze with (B) ER @ neutral- T-Band 10x5\" hold red     B horizontal ABD T-Bad 10x2 red     Cervical extension

## 2023-07-26 ENCOUNTER — APPOINTMENT (OUTPATIENT)
Dept: PHYSICAL THERAPY | Age: 58
End: 2023-07-26
Payer: COMMERCIAL

## 2023-07-27 ENCOUNTER — HOSPITAL ENCOUNTER (OUTPATIENT)
Dept: PHYSICAL THERAPY | Age: 58
Setting detail: THERAPIES SERIES
Discharge: HOME | End: 2023-07-27
Payer: COMMERCIAL

## 2023-07-27 NOTE — FLOWSHEET NOTE
[x] [facility]) - [facility] Therapy  [address]  [place]: [phone]   F: [phone]     Physical Therapy Cancel/No Show note    Date: 2023  Patient: Olivia Manuel  : 1965  MRN: [id number]    Visit Count:   Cancels/No Shows to date:     For today's appointment patient:    [x]  Cancelled    [] Rescheduled appointment    [] No-show     Reason given by patient:    []  Patient ill    []  Conflicting appointment    [] No transportation      [] Conflict with work    [] No reason given    [] Weather related    [] COVID-19    [] Other:   stuck in traffic in [address] make it.    Comments:        [x] Next appointment was confirmed    Electronically signed by: Gary Coyle, PT

## 2023-08-01 ENCOUNTER — HOSPITAL ENCOUNTER (OUTPATIENT)
Dept: PHYSICAL THERAPY | Age: 58
Setting detail: THERAPIES SERIES
Discharge: HOME | End: 2023-08-01
Payer: COMMERCIAL

## 2023-08-01 PROCEDURE — 97112 NEUROMUSCULAR REEDUCATION: CPT

## 2023-08-01 PROCEDURE — 97110 THERAPEUTIC EXERCISES: CPT

## 2023-08-01 NOTE — FLOWSHEET NOTE
[x] [facility]) - [facility] Therapy  [address]  [place]: [phone]   F: [phone]    Physical Therapy Daily Treatment Note      Date:  2023  Patient Name:  Alyson Bowling    :  1965  MRN: [id number]  Referring Provider: Sonya Berman MD                               Insurance: [address] [place] [address] 37 6 more visits for vestibular therapy from 23 - 23  Medical Diagnosis:S06.5X0A (ICD-10-CM) - Traumatic subdural hemorrhage without loss of consciousness, initial encounter  Rehab Codes: R42, M54.2, R51   Onset date: 22                 Next 's appt.: none scheduled   Visit# / total visits: 15/20 (3/6 on new C9)   Cancels/No Shows:4/0    Subjective: Patient reports today that she is having less dizziness but her headaches are more frequent. Dr. Carmel Johns is dicontinuing PT after this C9 is up. Patient plans to continue with HEP. She was prescribed more medication for her headaches. Says she went to a concert last week and she had some imbalance/dizziness but was able to recover.      Pain:  [] Yes  [x] No Location: posterior neck, headache Pain Rating: (0-10 scale) 0/10  Pain altered Tx:  [x] No  [] Yes  Action:    Comments:    Objective:  Precautions: history right tibia fx   Exercises:INSTRUCTION IN HOME EXERCISE PROGRAM  Exercise Reps/ Time Weight/ Level Comments Completed 23     Habituation: Chelsie Band left and right  5x each way    x   Habituation elbow to mat, cervical lateral flexion (work simulated task)  2X 5 each way   x   Habituation: Rolling sidelying to sidelying 5x to the right, 2x5 to the left   Initial dizziness on first rep to the left improved with repetition    Prone on total gym: Y, T, extension,  prone cervical retraction 2x10 each 2# with T, ext, Y and cervical retraction-active     Seated Head/eye coordination  H/V  2' each       Lower trapezius  2x10 RED  x   Rows   2x15 5\"  Green  x   Retro Shoulder shrugs with scapular squeeze 15X 3#     Wall

## 2023-08-03 ENCOUNTER — HOSPITAL ENCOUNTER (OUTPATIENT)
Dept: PHYSICAL THERAPY | Age: 58
Setting detail: THERAPIES SERIES
Discharge: HOME | End: 2023-08-03
Payer: COMMERCIAL

## 2023-08-03 PROCEDURE — 97110 THERAPEUTIC EXERCISES: CPT

## 2023-08-03 NOTE — DISCHARGE SUMMARY
[x] [facility]) - [facility] Therapy  [address]  [place]: [phone]   F: [phone]    PT Discharge Summary       Date:  8/3/2023  Patient Name:  Dayna Mahoney    :  1965  MRN: [id number]  Referring Provider: Eboni Gilmore MD                               Insurance: [address] 6 more visits for vestibular therapy from 23 - 23  Medical Diagnosis:S06.5X0A (ICD-10-CM) - Traumatic subdural hemorrhage without loss of consciousness, initial encounter  Rehab Codes: B34, M54.2, R51   Onset date: 22                 Next 's appt.: none scheduled   Visit# / total visits:  ( on new C9)               Cancels/No Shows:  Reporting Period: 23-8/3/23   Date of initial visit: 23      Subjective:   Reports today stating she is doing well. Has had some mild dizziness since last session but nothing she can't handle. Reports compliance with HEP. Reports 75-80% improvement in her dizziness since starting PT. Denies neck pain currently and that has been better overall. Continues to have headaches weekly. Reports lightheadedness still happens at work but less frequent and she is able to manage it. Pain:  [] Yes  [x] No Location: neck  Pain Rating: (0-10 scale)0/10  Pain altered Tx:  [x] No  [] Yes  Action:    Comments:    Objective:  DHI score- 16% impairment       Precautions: history right tibia fx   Exercises:INSTRUCTION IN HOME EXERCISE PROGRAM  Access Code: [email or web address]  URL: [employer]. [email or web address]  Date: 2023  Prepared by: Edi Rajput    Exercises  - Prone Shoulder Extension - Single Arm with Dumbbell  - 1 x daily - 7 x weekly - 2 sets - 10 reps  - Prone Single Arm Shoulder Y with Dumbbell  - 1 x daily - 7 x weekly - 2 sets - 10 reps  - Prone Single Arm Shoulder Horizontal Abduction with Dumbbell  - 1 x daily - 7 x weekly - 2 sets - 10 reps  - Prone Shoulder Row  - 1 x daily - 7 x weekly - 2 sets - 10 reps  - Scapular Retraction with

## 2023-11-22 ENCOUNTER — OFFICE VISIT (OUTPATIENT)
Dept: NEUROLOGY | Age: 58
End: 2023-11-22

## 2023-11-22 VITALS
DIASTOLIC BLOOD PRESSURE: 72 MMHG | BODY MASS INDEX: 24.24 KG/M2 | HEIGHT: 64 IN | SYSTOLIC BLOOD PRESSURE: 106 MMHG | WEIGHT: 142 LBS | HEART RATE: 79 BPM

## 2023-11-22 DIAGNOSIS — G44.309 HEADACHES DUE TO OLD HEAD INJURY: Primary | ICD-10-CM

## 2023-11-22 DIAGNOSIS — S09.90XS HEADACHES DUE TO OLD HEAD INJURY: Primary | ICD-10-CM

## 2023-11-22 RX ORDER — LANOLIN ALCOHOL/MO/W.PET/CERES
400 CREAM (GRAM) TOPICAL DAILY
Qty: 30 TABLET | Refills: 3 | Status: SHIPPED | OUTPATIENT
Start: 2023-11-22

## 2023-11-22 NOTE — PROGRESS NOTES
[facility]            [address]          [place] [address]          Dept: [phone]          Dept Fax: [phone]             11/22/2023      HISTORY OF PRESENT ILLNESS:       This is a 59 yr old female that presents for follow up for treatment of postconcussion syndrome. Interim History:  Patient notes that her dizzy spells and headaches have improved. She notes she has about one headache a week. She does take any medications typically for abortive therapy but for severe headaches she takes Fioricet as needed. Prior to this, she was having headaches 2-3 times a week. She notes she has increased water intake and drinks about 6 16.9 oz bottles a day. Denies any further episodes of fainting. She was taking magnesium regularly but recently ran out so stopped taking it. Prior History:    She notes she fainted at work on 2/2023. She notes she felt dizzy described as lightheadedness prior to the episode. She notes she sat down in the chair and passed out and had vomited. She woke up still felt sick and vomited again. She went to the ED was told she was dehydrated. She notes she continues to have dizzy spells since the episode and headaches have been worsening. She notes she is having vestibular therapy right now and is not sure if it is helping. 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 She notes when she leans sideways to clean laundry probes, she feels off balance. She reports she is having headaches 2-3 times a week. She typically doesn't take rescue medication. Headache is typically located in the bifrontal area and occipital area. She describes the pain as throbbing pain rated 5/10 in severity.            Testing reviewed:    MRI Brain W WO Contrast 11/9/2022  Impression       No discrete subdural hematoma or extra-axial collection is

## 2024-05-22 ENCOUNTER — OFFICE VISIT (OUTPATIENT)
Dept: NEUROLOGY | Age: 59
End: 2024-05-22

## 2024-05-22 VITALS
SYSTOLIC BLOOD PRESSURE: 114 MMHG | HEART RATE: 67 BPM | HEIGHT: 64 IN | BODY MASS INDEX: 22.71 KG/M2 | DIASTOLIC BLOOD PRESSURE: 67 MMHG | WEIGHT: 133 LBS

## 2024-05-22 DIAGNOSIS — F07.81 POST CONCUSSION SYNDROME: ICD-10-CM

## 2024-05-22 DIAGNOSIS — S06.5XAA SDH (SUBDURAL HEMATOMA) (HCC): ICD-10-CM

## 2024-05-22 DIAGNOSIS — Z87.820 HISTORY OF TRAUMATIC BRAIN INJURY: Primary | ICD-10-CM
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
## 2024-06-21 ENCOUNTER — TELEPHONE (OUTPATIENT)
Dept: NEUROLOGY | Age: 59
End: 2024-06-21

## 2024-06-21 NOTE — TELEPHONE ENCOUNTER
Pt called in stating that while at work yesterday, she works at [employer], she hit her head on the door handle of a washer. She stated that it was on her forehead. She informed her manager and filled out an incident report. Her manager wanted her to let us know. She is not having any dizziness or headaches.

## 2024-06-25 NOTE — TELEPHONE ENCOUNTER
I spoke with Rachelle. She said she has developed some headache and dizziness. I discussed this with Dr Barahona as this i her last day with this practice. She advised her to go to ER so she can be checked and get a CT Scan. I told Rachelle this. I also told her if this is a [facility] case then she needs to establish with a doctor of record as we are NOT DOCTOR of RECORD.  She voiced understanding.  She asked for a RX for Fioricet but Dr. Barahona hasn't prescribed that in the past so I advised her it is best that she get this from ED.

## 2025-05-21 ENCOUNTER — TELEPHONE (OUTPATIENT)
Dept: NEUROLOGY | Age: 60
End: 2025-05-21

## 2025-05-21 ENCOUNTER — OFFICE VISIT (OUTPATIENT)
Dept: NEUROLOGY | Age: 60
End: 2025-05-21

## 2025-05-21 VITALS
BODY MASS INDEX: 21.85 KG/M2 | SYSTOLIC BLOOD PRESSURE: 122 MMHG | DIASTOLIC BLOOD PRESSURE: 66 MMHG | WEIGHT: 128 LBS | HEART RATE: 75 BPM | HEIGHT: 64 IN

## 2025-05-21 DIAGNOSIS — S09.90XS HEADACHES DUE TO OLD HEAD INJURY: Primary | ICD-10-CM

## 2025-05-21 DIAGNOSIS — G44.309 HEADACHES DUE TO OLD HEAD INJURY: Primary | ICD-10-CM

## 2025-05-21 RX ORDER — MULTIVIT WITH MINERALS/LUTEIN
1000 TABLET ORAL 2 TIMES DAILY
COMMUNITY

## 2025-05-21 RX ORDER — B-COMPLEX WITH VITAMIN C
TABLET ORAL
COMMUNITY
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
## 2025-05-21 NOTE — TELEPHONE ENCOUNTER
Patient was wondering if the [employer] comp case will still be open and if so can someone call and let her no either way so she can make an appt in year or need be sooner.  AD